# Patient Record
Sex: FEMALE | Race: BLACK OR AFRICAN AMERICAN | NOT HISPANIC OR LATINO | Employment: UNEMPLOYED
[De-identification: names, ages, dates, MRNs, and addresses within clinical notes are randomized per-mention and may not be internally consistent; named-entity substitution may affect disease eponyms.]

---

## 2017-12-24 ENCOUNTER — HEALTH MAINTENANCE LETTER (OUTPATIENT)
Age: 22
End: 2017-12-24

## 2020-03-10 ENCOUNTER — HEALTH MAINTENANCE LETTER (OUTPATIENT)
Age: 25
End: 2020-03-10

## 2020-12-20 ENCOUNTER — HEALTH MAINTENANCE LETTER (OUTPATIENT)
Age: 25
End: 2020-12-20

## 2021-01-15 LAB — GROUP B STREP PCR: NEGATIVE

## 2021-01-19 ENCOUNTER — HOSPITAL ENCOUNTER (INPATIENT)
Facility: CLINIC | Age: 26
LOS: 2 days | Discharge: HOME | End: 2021-01-21
Attending: OBSTETRICS & GYNECOLOGY | Admitting: OBSTETRICS & GYNECOLOGY
Payer: COMMERCIAL

## 2021-01-19 DIAGNOSIS — O34.219 VBAC (VAGINAL BIRTH AFTER CESAREAN): Primary | ICD-10-CM

## 2021-01-19 DIAGNOSIS — O34.219 VBAC, DELIVERED: ICD-10-CM

## 2021-01-19 PROBLEM — O99.013 ANEMIA DURING PREGNANCY IN THIRD TRIMESTER: Status: ACTIVE | Noted: 2021-01-19

## 2021-01-19 PROBLEM — O14.90 PREECLAMPSIA: Status: ACTIVE | Noted: 2021-01-19

## 2021-01-19 PROBLEM — O99.213 OBESITY AFFECTING PREGNANCY IN THIRD TRIMESTER: Status: ACTIVE | Noted: 2021-01-19

## 2021-01-19 PROBLEM — D56.3 BETA THALASSEMIA TRAIT: Status: ACTIVE | Noted: 2021-01-19

## 2021-01-19 LAB
ABO + RH BLD: NORMAL
ABO + RH BLD: NORMAL
ALT SERPL W P-5'-P-CCNC: 21 U/L (ref 0–50)
AST SERPL W P-5'-P-CCNC: 23 U/L (ref 0–45)
CREAT SERPL-MCNC: 0.43 MG/DL (ref 0.52–1.04)
CREAT UR-MCNC: 42 MG/DL
ERYTHROCYTE [DISTWIDTH] IN BLOOD BY AUTOMATED COUNT: 15.5 % (ref 10–15)
GFR SERPL CREATININE-BSD FRML MDRD: >90 ML/MIN/{1.73_M2}
HCT VFR BLD AUTO: 30.2 % (ref 35–47)
HGB BLD-MCNC: 9.4 G/DL (ref 11.7–15.7)
HGB BLD-MCNC: 9.5 G/DL (ref 11.7–15.7)
MCH RBC QN AUTO: 23.9 PG (ref 26.5–33)
MCHC RBC AUTO-ENTMCNC: 31.1 G/DL (ref 31.5–36.5)
MCV RBC AUTO: 77 FL (ref 78–100)
PLATELET # BLD AUTO: 225 10E9/L (ref 150–450)
PROT UR-MCNC: 0.26 G/L
PROT/CREAT 24H UR: 0.62 G/G CR (ref 0–0.2)
RBC # BLD AUTO: 3.93 10E12/L (ref 3.8–5.2)
SPECIMEN EXP DATE BLD: NORMAL
T PALLIDUM AB SER QL: NONREACTIVE
WBC # BLD AUTO: 6.9 10E9/L (ref 4–11)

## 2021-01-19 PROCEDURE — 84460 ALANINE AMINO (ALT) (SGPT): CPT | Performed by: OBSTETRICS & GYNECOLOGY

## 2021-01-19 PROCEDURE — 36415 COLL VENOUS BLD VENIPUNCTURE: CPT | Performed by: OBSTETRICS & GYNECOLOGY

## 2021-01-19 PROCEDURE — 250N000009 HC RX 250: Performed by: OBSTETRICS & GYNECOLOGY

## 2021-01-19 PROCEDURE — 86780 TREPONEMA PALLIDUM: CPT | Performed by: OBSTETRICS & GYNECOLOGY

## 2021-01-19 PROCEDURE — 85018 HEMOGLOBIN: CPT | Performed by: OBSTETRICS & GYNECOLOGY

## 2021-01-19 PROCEDURE — 85027 COMPLETE CBC AUTOMATED: CPT | Performed by: OBSTETRICS & GYNECOLOGY

## 2021-01-19 PROCEDURE — 86900 BLOOD TYPING SEROLOGIC ABO: CPT | Performed by: OBSTETRICS & GYNECOLOGY

## 2021-01-19 PROCEDURE — 722N000001 HC LABOR CARE VAGINAL DELIVERY SINGLE

## 2021-01-19 PROCEDURE — 82565 ASSAY OF CREATININE: CPT | Performed by: OBSTETRICS & GYNECOLOGY

## 2021-01-19 PROCEDURE — 258N000003 HC RX IP 258 OP 636: Performed by: OBSTETRICS & GYNECOLOGY

## 2021-01-19 PROCEDURE — 3E033VJ INTRODUCTION OF OTHER HORMONE INTO PERIPHERAL VEIN, PERCUTANEOUS APPROACH: ICD-10-PCS | Performed by: OBSTETRICS & GYNECOLOGY

## 2021-01-19 PROCEDURE — 250N000013 HC RX MED GY IP 250 OP 250 PS 637: Performed by: OBSTETRICS & GYNECOLOGY

## 2021-01-19 PROCEDURE — 84450 TRANSFERASE (AST) (SGOT): CPT | Performed by: OBSTETRICS & GYNECOLOGY

## 2021-01-19 PROCEDURE — 84156 ASSAY OF PROTEIN URINE: CPT | Performed by: OBSTETRICS & GYNECOLOGY

## 2021-01-19 PROCEDURE — 250N000011 HC RX IP 250 OP 636: Performed by: OBSTETRICS & GYNECOLOGY

## 2021-01-19 PROCEDURE — 10907ZC DRAINAGE OF AMNIOTIC FLUID, THERAPEUTIC FROM PRODUCTS OF CONCEPTION, VIA NATURAL OR ARTIFICIAL OPENING: ICD-10-PCS | Performed by: OBSTETRICS & GYNECOLOGY

## 2021-01-19 PROCEDURE — 86901 BLOOD TYPING SEROLOGIC RH(D): CPT | Performed by: OBSTETRICS & GYNECOLOGY

## 2021-01-19 PROCEDURE — 120N000001 HC R&B MED SURG/OB

## 2021-01-19 RX ORDER — MAGNESIUM SULFATE HEPTAHYDRATE 40 MG/ML
4 INJECTION, SOLUTION INTRAVENOUS ONCE
Status: DISCONTINUED | OUTPATIENT
Start: 2021-01-19 | End: 2021-01-19

## 2021-01-19 RX ORDER — NALOXONE HYDROCHLORIDE 0.4 MG/ML
0.2 INJECTION, SOLUTION INTRAMUSCULAR; INTRAVENOUS; SUBCUTANEOUS
Status: DISCONTINUED | OUTPATIENT
Start: 2021-01-19 | End: 2021-01-19

## 2021-01-19 RX ORDER — IBUPROFEN 800 MG/1
800 TABLET, FILM COATED ORAL
Status: COMPLETED | OUTPATIENT
Start: 2021-01-19 | End: 2021-01-19

## 2021-01-19 RX ORDER — OXYTOCIN/0.9 % SODIUM CHLORIDE 30/500 ML
100 PLASTIC BAG, INJECTION (ML) INTRAVENOUS CONTINUOUS
Status: DISCONTINUED | OUTPATIENT
Start: 2021-01-19 | End: 2021-01-21 | Stop reason: HOSPADM

## 2021-01-19 RX ORDER — CALCIUM GLUCONATE 94 MG/ML
1 INJECTION, SOLUTION INTRAVENOUS
Status: DISCONTINUED | OUTPATIENT
Start: 2021-01-19 | End: 2021-01-19

## 2021-01-19 RX ORDER — OXYTOCIN/0.9 % SODIUM CHLORIDE 30/500 ML
1-24 PLASTIC BAG, INJECTION (ML) INTRAVENOUS CONTINUOUS
Status: DISCONTINUED | OUTPATIENT
Start: 2021-01-19 | End: 2021-01-19

## 2021-01-19 RX ORDER — LIDOCAINE 40 MG/G
CREAM TOPICAL
Status: DISCONTINUED | OUTPATIENT
Start: 2021-01-19 | End: 2021-01-19

## 2021-01-19 RX ORDER — METHYLERGONOVINE MALEATE 0.2 MG/ML
200 INJECTION INTRAVENOUS
Status: DISCONTINUED | OUTPATIENT
Start: 2021-01-19 | End: 2021-01-19

## 2021-01-19 RX ORDER — CARBOPROST TROMETHAMINE 250 UG/ML
250 INJECTION, SOLUTION INTRAMUSCULAR
Status: DISCONTINUED | OUTPATIENT
Start: 2021-01-19 | End: 2021-01-21 | Stop reason: HOSPADM

## 2021-01-19 RX ORDER — ONDANSETRON 4 MG/1
4 TABLET, ORALLY DISINTEGRATING ORAL EVERY 6 HOURS PRN
Status: DISCONTINUED | OUTPATIENT
Start: 2021-01-19 | End: 2021-01-19

## 2021-01-19 RX ORDER — LABETALOL HYDROCHLORIDE 5 MG/ML
40 INJECTION, SOLUTION INTRAVENOUS EVERY 10 MIN PRN
Status: DISCONTINUED | OUTPATIENT
Start: 2021-01-19 | End: 2021-01-21 | Stop reason: HOSPADM

## 2021-01-19 RX ORDER — LABETALOL HYDROCHLORIDE 5 MG/ML
20 INJECTION, SOLUTION INTRAVENOUS EVERY 10 MIN PRN
Status: DISCONTINUED | OUTPATIENT
Start: 2021-01-19 | End: 2021-01-21 | Stop reason: HOSPADM

## 2021-01-19 RX ORDER — ONDANSETRON 2 MG/ML
4 INJECTION INTRAMUSCULAR; INTRAVENOUS EVERY 6 HOURS PRN
Status: DISCONTINUED | OUTPATIENT
Start: 2021-01-19 | End: 2021-01-19

## 2021-01-19 RX ORDER — NALOXONE HYDROCHLORIDE 0.4 MG/ML
0.4 INJECTION, SOLUTION INTRAMUSCULAR; INTRAVENOUS; SUBCUTANEOUS
Status: DISCONTINUED | OUTPATIENT
Start: 2021-01-19 | End: 2021-01-19

## 2021-01-19 RX ORDER — LORAZEPAM 2 MG/ML
2 INJECTION INTRAMUSCULAR
Status: DISCONTINUED | OUTPATIENT
Start: 2021-01-19 | End: 2021-01-21 | Stop reason: HOSPADM

## 2021-01-19 RX ORDER — MAGNESIUM SULFATE HEPTAHYDRATE 500 MG/ML
4 INJECTION, SOLUTION INTRAMUSCULAR; INTRAVENOUS
Status: DISCONTINUED | OUTPATIENT
Start: 2021-01-19 | End: 2021-01-19

## 2021-01-19 RX ORDER — OXYTOCIN 10 [USP'U]/ML
10 INJECTION, SOLUTION INTRAMUSCULAR; INTRAVENOUS
Status: DISCONTINUED | OUTPATIENT
Start: 2021-01-19 | End: 2021-01-21 | Stop reason: HOSPADM

## 2021-01-19 RX ORDER — TRANEXAMIC ACID 10 MG/ML
1 INJECTION, SOLUTION INTRAVENOUS EVERY 30 MIN PRN
Status: DISCONTINUED | OUTPATIENT
Start: 2021-01-19 | End: 2021-01-21 | Stop reason: HOSPADM

## 2021-01-19 RX ORDER — LIDOCAINE HYDROCHLORIDE AND EPINEPHRINE 15; 5 MG/ML; UG/ML
3 INJECTION, SOLUTION EPIDURAL
Status: DISCONTINUED | OUTPATIENT
Start: 2021-01-19 | End: 2021-01-19

## 2021-01-19 RX ORDER — HYDROCORTISONE 2.5 %
CREAM (GRAM) TOPICAL 3 TIMES DAILY PRN
Status: DISCONTINUED | OUTPATIENT
Start: 2021-01-19 | End: 2021-01-21 | Stop reason: HOSPADM

## 2021-01-19 RX ORDER — OXYCODONE AND ACETAMINOPHEN 5; 325 MG/1; MG/1
1 TABLET ORAL
Status: DISCONTINUED | OUTPATIENT
Start: 2021-01-19 | End: 2021-01-19

## 2021-01-19 RX ORDER — MAGNESIUM SULFATE HEPTAHYDRATE 40 MG/ML
2 INJECTION, SOLUTION INTRAVENOUS
Status: DISCONTINUED | OUTPATIENT
Start: 2021-01-19 | End: 2021-01-19

## 2021-01-19 RX ORDER — MISOPROSTOL 200 UG/1
400 TABLET ORAL
Status: DISCONTINUED | OUTPATIENT
Start: 2021-01-19 | End: 2021-01-21 | Stop reason: HOSPADM

## 2021-01-19 RX ORDER — NALBUPHINE HYDROCHLORIDE 10 MG/ML
2.5-5 INJECTION, SOLUTION INTRAMUSCULAR; INTRAVENOUS; SUBCUTANEOUS EVERY 6 HOURS PRN
Status: DISCONTINUED | OUTPATIENT
Start: 2021-01-19 | End: 2021-01-19

## 2021-01-19 RX ORDER — MAGNESIUM SULFATE HEPTAHYDRATE 40 MG/ML
4 INJECTION, SOLUTION INTRAVENOUS
Status: DISCONTINUED | OUTPATIENT
Start: 2021-01-19 | End: 2021-01-19

## 2021-01-19 RX ORDER — SODIUM CHLORIDE, SODIUM LACTATE, POTASSIUM CHLORIDE, CALCIUM CHLORIDE 600; 310; 30; 20 MG/100ML; MG/100ML; MG/100ML; MG/100ML
10-125 INJECTION, SOLUTION INTRAVENOUS CONTINUOUS
Status: DISCONTINUED | OUTPATIENT
Start: 2021-01-19 | End: 2021-01-19

## 2021-01-19 RX ORDER — MODIFIED LANOLIN
OINTMENT (GRAM) TOPICAL
Status: DISCONTINUED | OUTPATIENT
Start: 2021-01-19 | End: 2021-01-21 | Stop reason: HOSPADM

## 2021-01-19 RX ORDER — BISACODYL 10 MG
10 SUPPOSITORY, RECTAL RECTAL DAILY PRN
Status: DISCONTINUED | OUTPATIENT
Start: 2021-01-21 | End: 2021-01-21 | Stop reason: HOSPADM

## 2021-01-19 RX ORDER — OXYTOCIN/0.9 % SODIUM CHLORIDE 30/500 ML
100-340 PLASTIC BAG, INJECTION (ML) INTRAVENOUS CONTINUOUS PRN
Status: DISCONTINUED | OUTPATIENT
Start: 2021-01-19 | End: 2021-01-19

## 2021-01-19 RX ORDER — EPHEDRINE SULFATE 50 MG/ML
5 INJECTION, SOLUTION INTRAMUSCULAR; INTRAVENOUS; SUBCUTANEOUS
Status: DISCONTINUED | OUTPATIENT
Start: 2021-01-19 | End: 2021-01-19

## 2021-01-19 RX ORDER — LABETALOL HYDROCHLORIDE 5 MG/ML
80 INJECTION, SOLUTION INTRAVENOUS EVERY 10 MIN PRN
Status: DISCONTINUED | OUTPATIENT
Start: 2021-01-19 | End: 2021-01-21 | Stop reason: HOSPADM

## 2021-01-19 RX ORDER — CARBOPROST TROMETHAMINE 250 UG/ML
250 INJECTION, SOLUTION INTRAMUSCULAR
Status: DISCONTINUED | OUTPATIENT
Start: 2021-01-19 | End: 2021-01-19

## 2021-01-19 RX ORDER — OXYTOCIN/0.9 % SODIUM CHLORIDE 30/500 ML
340 PLASTIC BAG, INJECTION (ML) INTRAVENOUS CONTINUOUS PRN
Status: DISCONTINUED | OUTPATIENT
Start: 2021-01-19 | End: 2021-01-21 | Stop reason: HOSPADM

## 2021-01-19 RX ORDER — ACETAMINOPHEN 325 MG/1
650 TABLET ORAL EVERY 4 HOURS PRN
Status: DISCONTINUED | OUTPATIENT
Start: 2021-01-19 | End: 2021-01-21 | Stop reason: HOSPADM

## 2021-01-19 RX ORDER — TRANEXAMIC ACID 10 MG/ML
1 INJECTION, SOLUTION INTRAVENOUS EVERY 30 MIN PRN
Status: DISCONTINUED | OUTPATIENT
Start: 2021-01-19 | End: 2021-01-19

## 2021-01-19 RX ORDER — SODIUM CHLORIDE, SODIUM LACTATE, POTASSIUM CHLORIDE, CALCIUM CHLORIDE 600; 310; 30; 20 MG/100ML; MG/100ML; MG/100ML; MG/100ML
INJECTION, SOLUTION INTRAVENOUS CONTINUOUS
Status: DISCONTINUED | OUTPATIENT
Start: 2021-01-19 | End: 2021-01-19

## 2021-01-19 RX ORDER — OXYTOCIN 10 [USP'U]/ML
10 INJECTION, SOLUTION INTRAMUSCULAR; INTRAVENOUS
Status: DISCONTINUED | OUTPATIENT
Start: 2021-01-19 | End: 2021-01-19

## 2021-01-19 RX ORDER — AMOXICILLIN 250 MG
1 CAPSULE ORAL 2 TIMES DAILY
Status: DISCONTINUED | OUTPATIENT
Start: 2021-01-19 | End: 2021-01-21 | Stop reason: HOSPADM

## 2021-01-19 RX ORDER — AMOXICILLIN 250 MG
2 CAPSULE ORAL 2 TIMES DAILY
Status: DISCONTINUED | OUTPATIENT
Start: 2021-01-19 | End: 2021-01-21 | Stop reason: HOSPADM

## 2021-01-19 RX ORDER — MAGNESIUM SULFATE IN WATER 40 MG/ML
2 INJECTION, SOLUTION INTRAVENOUS CONTINUOUS
Status: DISCONTINUED | OUTPATIENT
Start: 2021-01-19 | End: 2021-01-19

## 2021-01-19 RX ORDER — FENTANYL CITRATE 50 UG/ML
50-100 INJECTION, SOLUTION INTRAMUSCULAR; INTRAVENOUS
Status: DISCONTINUED | OUTPATIENT
Start: 2021-01-19 | End: 2021-01-19

## 2021-01-19 RX ORDER — IBUPROFEN 800 MG/1
800 TABLET, FILM COATED ORAL EVERY 6 HOURS PRN
Status: DISCONTINUED | OUTPATIENT
Start: 2021-01-20 | End: 2021-01-21 | Stop reason: HOSPADM

## 2021-01-19 RX ORDER — ACETAMINOPHEN 325 MG/1
650 TABLET ORAL EVERY 4 HOURS PRN
Status: DISCONTINUED | OUTPATIENT
Start: 2021-01-19 | End: 2021-01-19

## 2021-01-19 RX ORDER — NIFEDIPINE 10 MG/1
10 CAPSULE ORAL
Status: DISCONTINUED | OUTPATIENT
Start: 2021-01-19 | End: 2021-01-19

## 2021-01-19 RX ADMIN — IBUPROFEN 800 MG: 800 TABLET, FILM COATED ORAL at 18:52

## 2021-01-19 RX ADMIN — DOCUSATE SODIUM 50 MG AND SENNOSIDES 8.6 MG 1 TABLET: 8.6; 5 TABLET, FILM COATED ORAL at 21:58

## 2021-01-19 RX ADMIN — FENTANYL CITRATE 100 MCG: 50 INJECTION, SOLUTION INTRAMUSCULAR; INTRAVENOUS at 16:35

## 2021-01-19 RX ADMIN — OXYTOCIN-SODIUM CHLORIDE 0.9% IV SOLN 30 UNIT/500ML 2 MILLI-UNITS/MIN: 30-0.9/5 SOLUTION at 12:18

## 2021-01-19 RX ADMIN — SODIUM CHLORIDE, POTASSIUM CHLORIDE, SODIUM LACTATE AND CALCIUM CHLORIDE: 600; 310; 30; 20 INJECTION, SOLUTION INTRAVENOUS at 12:10

## 2021-01-19 SDOH — ECONOMIC STABILITY: INCOME INSECURITY: HOW HARD IS IT FOR YOU TO PAY FOR THE VERY BASICS LIKE FOOD, HOUSING, MEDICAL CARE, AND HEATING?: NOT ASKED

## 2021-01-19 SDOH — ECONOMIC STABILITY: FOOD INSECURITY: WITHIN THE PAST 12 MONTHS, YOU WORRIED THAT YOUR FOOD WOULD RUN OUT BEFORE YOU GOT MONEY TO BUY MORE.: NOT ASKED

## 2021-01-19 SDOH — ECONOMIC STABILITY: TRANSPORTATION INSECURITY
IN THE PAST 12 MONTHS, HAS LACK OF TRANSPORTATION KEPT YOU FROM MEETINGS, WORK, OR FROM GETTING THINGS NEEDED FOR DAILY LIVING?: NOT ASKED

## 2021-01-19 SDOH — ECONOMIC STABILITY: FOOD INSECURITY: WITHIN THE PAST 12 MONTHS, THE FOOD YOU BOUGHT JUST DIDN'T LAST AND YOU DIDN'T HAVE MONEY TO GET MORE.: NOT ASKED

## 2021-01-19 SDOH — ECONOMIC STABILITY: TRANSPORTATION INSECURITY
IN THE PAST 12 MONTHS, HAS THE LACK OF TRANSPORTATION KEPT YOU FROM MEDICAL APPOINTMENTS OR FROM GETTING MEDICATIONS?: NOT ASKED

## 2021-01-19 ASSESSMENT — MIFFLIN-ST. JEOR: SCORE: 1768.27

## 2021-01-19 NOTE — H&P
[facility] Labor and Delivery History and Physical    Titus Arnold MRN# [id number]   Age: 25 year old YOB: 1995     Date of Admission:  2021    Primary OB Provider:  Dr. Gusman           Chief Complaint:   Titus Arnold is a 25 year old female who is 37w1d pregnant and being admitted for induction of labor due to pre-eclampsia    HPI:  Pt is a 26 yo  at 37 weeks 1 day gestation who presents with elevated blood pressure and elevate TP/Cr ratio 0.62 consistent with pre-eclampsia.  IOL recommended at this gestational age.    This patient established care at 9 weeks 1 day gestation.  Her last pregnancy was complicated by PPROM with di/di twins at 31 weeks 5 days gestation.  She saw MFM at 16 weeks and her cervical length was normal at 3.47 cm.  She declined 17 OHP injections.  She then had a level II ultrasound which showed normal fetal anatomy and normal cervical length.  Her prenatal labs did also reveal that she was Hepatitis B non-immune and she did receive her Hepatitis B vaccine series during htis pregnancy.  Her early Hgb A1c was elevated, however her 1 hr GTT at 28 weeks was normal.  She also had low Hgb (9.0) however she has Beta thalassemia trait and hematology did not recommend iron therapy.    Her BP was mildly elevated at 35 weeks at 140/78.  Her labs were normal.  Her BP was normal at her next OB visit.  She did have COVID during this pregnancy (2020) and has recovered.         Pregnancy history:     OBSTETRIC HISTORY:    OB History    Para Term  AB Living   3 2 1 1 0 3   SAB TAB Ectopic Multiple Live Births   0 0 0 1 3      # Outcome Date GA Lbr Krish/2nd Weight Sex Delivery Anes PTL Lv   3 Current            2A  08/26/15 31w5d 00:20 / 00:03 1.66 kg (3 lb 10.6 oz) F Vag-Spont   SHANE      Complications:  premature rupture of membranes (PPROM) delivered, current hospitalization      Apgar1: 6  Apgar5: 8   2B  08/26/15 31w5d  00:20 / 00:15 1.91 kg (4 lb 3.4 oz) M CS-LTranv   SHANE      Complications:  premature rupture of membranes (PPROM) delivered, current hospitalization, Placental abruption, Transverse lie of fetus      Apgar1: 2  Apgar5: 7   1 Term 10/2006 40w0d   F   N SHANE       EDC: Estimated Date of Delivery: 21    Prenatal Labs:   Lab Results   Component Value Date    ABO PENDING 2021    RH  Pos 2015    AS Neg 2015    HEPBANG Negative 2015    TREPAB Non-reactive 2015    HGB 9.5 (L) 2021       GBS Status:   Lab Results   Component Value Date    GBS NEGATIVE 01/15/2021       Active Problem List  Patient Active Problem List   Diagnosis     Preeclampsia     Indication for care in labor or delivery     Anemia during pregnancy in third trimester     Obesity affecting pregnancy in third trimester     Previous  delivery, antepartum condition or complication     Beta thalassemia trait       Medication Prior to Admission  Medications Prior to Admission   Medication Sig Dispense Refill Last Dose     ferrous sulfate (IRON) 325 (65 FE) MG tablet Take 1 tablet (325 mg) by mouth 2 times daily (with meals) 60 tablet 0 Past Month at Unknown time     Prenatal Vit-Fe Fumarate-FA (PRENATAL MULTIVITAMIN  PLUS IRON) 27-0.8 MG TABS Take 1 tablet by mouth daily   2021 at Unknown time     senna-docusate (SENOKOT-S;PERICOLACE) 8.6-50 MG per tablet Take 1-2 tablets by mouth 2 times daily 30 tablet 0 Past Week at Unknown time     Ferrous Sulfate (IRON SUPPLEMENT PO)       .        Maternal Past Medical History:     Past Medical History:   Diagnosis Date     Anemia      Beta thalassemia (H)      Clinical diagnosis of COVID-19 2020     Gestational diabetes mellitus, class A2 2015     Labial cyst 2020     Obesity                        Family History:   This patient has no significant family history            Social History:     Social History     Tobacco Use     Smoking  "status: Never Smoker     Smokeless tobacco: Never Used   Substance Use Topics     Alcohol use: No     Drug use: No              Review of Systems:   The Review of Systems is negative other than noted in the HPI          Physical Exam:     Vitals were reviewed  Patient Vitals for the past 8 hrs:   BP Temp Temp src Resp Height Weight   21 1130 137/83 -- -- -- -- --   21 1100 130/77 -- -- -- -- --   21 1025 137/83 -- -- -- -- --   21 0955 (!) 141/86 -- -- -- -- --   21 0952 -- 99.1  F (37.3  C) Oral 16 1.676 m (5' 6\") 101.2 kg (223 lb)   21 0940 (!) 145/88 -- -- -- -- --     Gen:  Pt is alert and oriented x 3.  No acute distress  Abd:  Soft, gravid, nontender  Ext:  1+ edema  Cervix:   Membranes: intact   Dilation: 2   Effacement: 50%   Station:-2   Consistency: soft   Position: Anterior  Presentation:Cephalic  Fetal Heart Rate Tracing: reactive and reassuring  Tocometer: irregular contractions                       Assessment:   Titus Arnold is a 37w1d pregnant female admitted with pre-eclampsia          Plan:   Admit to Family Birth  Pt desires TOLAC - pt signed consent in clinic however I did again discussed risks associated with TOLAC including risk of uterine rupture which is a rare but catastrophic complication which could result in an emergency  section, hemorrhage, need for hysterectomy, fetal distress, fetal hypoxia and/or fetal death.  Despite these risks, be wished to attempt TOLAC  Continuous fetal monitoring  Pitocin induction  Will AROM next exam  Routine labor care    Es Garibay MD  Ob/Gyn  [facility]  [phone]      Es Garibay MD  "

## 2021-01-19 NOTE — PLAN OF CARE
Data: [facility] presented [facility]: 2021  9:27 AM.  Reason for maternal/fetal assessment is hypertension disorder of pregnancy. Patient reports being in clinic and had elevated bp 141/77, pt denies any other symptoms, noted trace of swelling @ble and bilat hands.  Patient is a .  Prenatal record reviewed. Pregnancy has been uncomplicated. Pt reports first pregnancy was twins, baby A was delivered vaginally, baby B was delivered via c/s, second pregnancy was a vaginal delivery () in [facility].  Gestational Age 37w1d. VSS. Fetal movement present. Patient denies uterine contractions, leaking of vaginal fluid/rupture of membranes, vaginal bleeding, abdominal pain, pelvic pressure, nausea, vomiting, headache, visual disturbances, epigastric or URQ pain. Support person is not present.   Action: Verbal consent for EFM. Triage assessment completed. Bill of rights reviewed.  Response: Patient verbalized agreement with plan. Will contact Dr Es Garibay with update and further orders.

## 2021-01-19 NOTE — PLAN OF CARE
MD updated regarding lab values in department and blood pressure readings. Orders to induce for pre-clampsia. Cervix favorable 2/60/-2 with a cruz score of 8. Orders for standard pitocin induction, plan to AROM in 2 hours. Standard hypertensive order set placed with labetalol progression for treatment as needed.     Patient has had one successful vaginal birth,  with twins, and this is a TOLAC, consent for TOLAC is signed and scanned in medical record.

## 2021-01-19 NOTE — PROVIDER NOTIFICATION
21 1351   Provider Notification   Provider Name/Title Dr. Garibay   Method of Notification At Bedside     MD at bedside for AROM. Small amount of clear fluid. SVE 3/60/-2. Anticipate .

## 2021-01-19 NOTE — PROGRESS NOTES
"S:  Starting to feel mild contractions  O:  /83   Temp 99.1  F (37.3  C) (Oral)   Resp 16   Ht 1.676 m (5' 6\")   Wt 101.2 kg (223 lb)   BMI 35.99 kg/m    FHT:  130s moderate variability  [place]:  Irreg  SVE:  360/-2    A/P:  26 yo  female at 37 weeks 1 day - IOL for pre-eclampsia/  -pitocin at 6 mu/min  -AROM - clear  -FHT reassuring  -continue current care    Es Garibay MD  Ob/Gyn  [facility]  [phone]    "

## 2021-01-20 LAB
ALT SERPL W P-5'-P-CCNC: 22 U/L (ref 0–50)
AST SERPL W P-5'-P-CCNC: 32 U/L (ref 0–45)
CREAT SERPL-MCNC: 0.46 MG/DL (ref 0.52–1.04)
ERYTHROCYTE [DISTWIDTH] IN BLOOD BY AUTOMATED COUNT: 15.3 % (ref 10–15)
GFR SERPL CREATININE-BSD FRML MDRD: >90 ML/MIN/{1.73_M2}
HCT VFR BLD AUTO: 29 % (ref 35–47)
HGB BLD-MCNC: 9.2 G/DL (ref 11.7–15.7)
MCH RBC QN AUTO: 24.1 PG (ref 26.5–33)
MCHC RBC AUTO-ENTMCNC: 31.7 G/DL (ref 31.5–36.5)
MCV RBC AUTO: 76 FL (ref 78–100)
PLATELET # BLD AUTO: 220 10E9/L (ref 150–450)
RBC # BLD AUTO: 3.82 10E12/L (ref 3.8–5.2)
WBC # BLD AUTO: 10.7 10E9/L (ref 4–11)

## 2021-01-20 PROCEDURE — 84460 ALANINE AMINO (ALT) (SGPT): CPT | Performed by: OBSTETRICS & GYNECOLOGY

## 2021-01-20 PROCEDURE — 250N000013 HC RX MED GY IP 250 OP 250 PS 637: Performed by: OBSTETRICS & GYNECOLOGY

## 2021-01-20 PROCEDURE — 120N000001 HC R&B MED SURG/OB

## 2021-01-20 PROCEDURE — 36415 COLL VENOUS BLD VENIPUNCTURE: CPT | Performed by: OBSTETRICS & GYNECOLOGY

## 2021-01-20 PROCEDURE — 84450 TRANSFERASE (AST) (SGOT): CPT | Performed by: OBSTETRICS & GYNECOLOGY

## 2021-01-20 PROCEDURE — 82565 ASSAY OF CREATININE: CPT | Performed by: OBSTETRICS & GYNECOLOGY

## 2021-01-20 PROCEDURE — 85027 COMPLETE CBC AUTOMATED: CPT | Performed by: OBSTETRICS & GYNECOLOGY

## 2021-01-20 RX ORDER — AMOXICILLIN 250 MG
1 CAPSULE ORAL 2 TIMES DAILY PRN
Qty: 60 TABLET | Refills: 0 | Status: SHIPPED | OUTPATIENT
Start: 2021-01-20 | End: 2022-04-13

## 2021-01-20 RX ORDER — ACETAMINOPHEN 325 MG/1
650 TABLET ORAL EVERY 6 HOURS PRN
Qty: 30 TABLET | Refills: 0 | Status: SHIPPED | OUTPATIENT
Start: 2021-01-20

## 2021-01-20 RX ORDER — IBUPROFEN 800 MG/1
800 TABLET, FILM COATED ORAL EVERY 6 HOURS PRN
Qty: 30 TABLET | Refills: 0 | Status: ON HOLD | OUTPATIENT
Start: 2021-01-20 | End: 2023-04-29

## 2021-01-20 RX ADMIN — ACETAMINOPHEN 650 MG: 325 TABLET, FILM COATED ORAL at 16:55

## 2021-01-20 RX ADMIN — DOCUSATE SODIUM 50 MG AND SENNOSIDES 8.6 MG 1 TABLET: 8.6; 5 TABLET, FILM COATED ORAL at 08:18

## 2021-01-20 RX ADMIN — DOCUSATE SODIUM 50 MG AND SENNOSIDES 8.6 MG 1 TABLET: 8.6; 5 TABLET, FILM COATED ORAL at 21:52

## 2021-01-20 NOTE — PLAN OF CARE
Data: Titus Arnold transferred to [facility] via wheelchair at 2055. Baby transferred via parent's arms.  Action: Receiving unit notified of transfer: Yes. Patient and family notified of room change. Report given to Ursula Sena RN at 2100. Belongings sent to receiving unit. Accompanied by Registered Nurse. Oriented patient to surroundings. Call light within reach. ID bands double-checked with receiving RN.  Response: Patient tolerated transfer and is stable.

## 2021-01-20 NOTE — PROGRESS NOTES
[facility]  PPD# 1    Pt doing well. Denies any CP, SOB, LH, HA, scotomata, RUQ or epigastric pain. Ambulating, voiding, tolerating PO. Decreased lochia. Pain controlled. Breast feeding and coping well with infant.    Vitals:    21 2110 21 0006 21 0557 21 0810   BP: 137/73 129/72 133/68 133/76   Pulse: 93 83 78 79   Resp: 18 18 18 18   Temp: 98  F (36.7  C) 98.7  F (37.1  C) 97.9  F (36.6  C) 98.5  F (36.9  C)   TempSrc: Oral Oral Oral Oral   Weight:       Height:         Abd soft, appropriately tender, nondistended, FFBU, no fundal tenderness  Ext 1+ edema bilat LE, no CT BL    Lab Results   Component Value Date    HGB 9.5 2021       A/P 25 year old  at 37w1d s/p  PPD# 1. Pregnancy complicated by pre-eclampsia w/o SF, beta thalassemia trait, Covid-19 (20), hx of prior CD.     -Hemodynamically stable   - Rh pos  -Diet; regular  -pain Tylenol and Motrin  -Bowel ppx- Senna/docusate/simethicone  -Rubella immune  -Tdap given  -Breast feeding, breast pump provided  - Preeclampsia w/o SF   - BP have been WNL   - pre-E labs WNL   - no signs or symptoms of severe features  - MYRIAM   - continue PO iron, rich iron diet, vitamin C    -Plan; continue routine post-partum care. Anticipate discharge home PPD#2      Dr. Jesse Cordova  [phone]  2021 9:53 PM

## 2021-01-20 NOTE — L&D DELIVERY NOTE
OB Vaginal Delivery Note    Titus Arnold MRN# [id number]   Age: 25 year old YOB: 1995       GA: 37w1d  GP:   Labor Complications: Preeclampsia/Hypertension   EBL: 200  mL  Delivery QBL: 200 mL  Delivery Type: , Spontaneous   ROM to Delivery Time: (Delivered) Hours: 4 Minutes: 43   Weight:  Pending   1 Minute 5 Minute 10 Minute   Apgar Totals: 9   9             Delivery Details:  Titus Arnold, a 25 year old  female was admitted with pre-eclampsia.  IOL was recommended.  Pitocin was initiated. AROM was performed for clear fluid.  She progressed normally in labor.  She became complete and pushed very effectively.  She then delivered a viable female infant with apgars of 9  and 9 .  Delivery was via , spontaneous  to a sterile field under intravenous regional  anesthesia. Infant delivered in vertex  right  occiput  anterior  position. Anterior and posterior shoulders delivered without difficulty. The cord was clamped after 60 second delay and cut by the father of the baby.  3 vessels  were noted. Cord blood was obtained in routine fashion with the following disposition: lab .      Cord complications: none   Placenta delivered at 2021  6:43 PM . Placental disposition was Hospital disposal . Fundal massage performed and fundus found to be firm.     Episiotomy: none    Perineum, vagina, cervix were inspected, and the following lacerations were noted:   Perineal lacerations: none                No lacerations were noted.    Excellent hemostasis was noted. Needle count correct. Infant and patient in delivery room in good and stable condition.        [place]ina Female-Titus [[id number]]    Labor Event Times    Labor onset date: 21 Onset time:  3:00 PM   Dilation complete date: 21 Complete time:  6:36 PM   Start pushing date/time: 2021 1835      Labor Events     labor?: No   steroids: None  Labor Type: Induction/Cervical ripening  Predominate  monitoring during 1st stage: continuous electronic fetal monitoring     Antibiotics received during labor?: No     Rupture date/time: 21 1354   Rupture type: Artificial Rupture of Membranes  Fluid color: Clear  Fluid odor: Normal     Induction: Oxytocin  Induction date/time:     Cervical ripening date/time:     Indications for induction: Hypertension     Delivery/Placenta Date and Time    Delivery Date: 21 Delivery Time:  6:37 PM   Placenta Date/Time: 2021  6:43 PM  Oxytocin given at the time of delivery: after delivery of baby     Vaginal Counts     Initial count performed by 2 team members:  Two Team Members   Dr. Phoenix Hanley       [employer]   Initial counts 2  5    Added to count       Final counts 2  5    Placed during labor Accounted for at the end of labor   No NA   No NA   No NA    Final count performed by 2 team members:  Two Team Members   Dr. Phoenix Hanley RN      Final count correct?: Yes     Apgars    Living status: Living   1 Minute 5 Minute 10 Minute 15 Minute 20 Minute   Skin color: 1  1       Heart rate: 2  2       Reflex irritability: 2  2       Muscle tone: 2  2       Respiratory effort: 2  2       Total: 9  9       Apgars assigned by: LOGAN HANLEY RN     Cord    Vessels: 3 Vessels Complications: None   Cord Blood Disposition: Lab Gases Sent?: No      [place] Resuscitation    Methods: None     Skin to Skin and Feeding Plan    Skin to skin initiation date/time:     Skin to skin end date/time:     How do you plan to feed your baby: Breastfeeding     Labor Events and Shoulder Dystocia    Fetal Tracing Prior to Delivery: Category 1  Shoulder dystocia present?: Neg     Delivery (Maternal) (Provider to Complete) ([id number])    Episiotomy: None  Perineal lacerations: None    Est. blood loss (mL): 200     Blood Loss  Mother: Titus Arnold T #[id number]   Start of Mother's Information    IO Blood Loss  21 1500 - 21 1853    EBL (mL)  Hospital Encounter 200 mL    Delivery QBL (mL) Hospital Encounter 200 mL    Total  400 mL         End of Mother's Information  Mother: Titus Arnold T #[id number]          Delivery - Provider to Complete ([id number])    Delivering clinician: Es Garibay MD  Attempted Delivery Types (Choose all that apply): Vaginal Birth After   Delivery Type (Choose the 1 that will go to the Birth History): , Spontaneous                                 Placenta    Delayed Cord Clamping: Done  Date/Time: 2021  6:43 PM  Removal: Spontaneous  Comments: Intact.  3 vessel cord.  Disposition: Hospital disposal           Anesthesia    Method: INTRAVENOUS REGIONAL                Presentation and Position    Presentation: Vertex    Position: Right Occiput Anterior                 Es Garibay MD

## 2021-01-20 NOTE — PLAN OF CARE
VSS, uterus and bleeding WNL. Up ad nathan. Independent with self and  cares. Pain well-controlled by occasional ibuprofen. No headache, changes in vision or chest pain noted. Breastfeeding well. With tender nipples- motherslove cream and hydrogel provided. Saline lock flushed, patent. For pre-eclampsia lab draw this morning.  present at bedside.

## 2021-01-20 NOTE — PLAN OF CARE
Data: Vital signs within normal limits. Postpartum checks within normal limits - see flow record. Patient eating and drinking normally. Patient able to empty bladder independently and is up ambulating. No apparent signs of infection. Patient performing self cares and is able to care for infant.  Patient independent with breastfeeding. Patient denies need for pain medication this shift, aware that it is available if needed.  Action: Patient education done about self and infant cares, breastfeeding, discharge planning. See flow record.  Response: Positive attachment behaviors observed with infant. Support persons are present- spouse Lobo is at bedside.   Plan: Anticipate discharge on 1/21/2021.

## 2021-01-20 NOTE — PROGRESS NOTES
"S:  More uncomfortable.  Feeling some urge to push.  O:  /83   Temp 98.4  F (36.9  C) (Oral)   Resp 16   Ht 1.676 m (5' 6\")   Wt 101.2 kg (223 lb)   BMI 35.99 kg/m    FHT:  120-130s moderate variability  [place]:  q 1-3  SVE:  8/100/-1 - baby asynclitic - position changes    A\P:  26 yo  female at 37 weeks 1 day - IOL for pre-eclampsia/  -pitocin at 3 mu/min  -FHT reassuring  -s/p fentanyl  -continue current care  -expect vaginal delivery soon    Es Garibay MD  Ob/Gyn  [facility]  [phone]    "

## 2021-01-20 NOTE — PLAN OF CARE
To room 448 from L&D per wheelchair at 2100. Oriented to room and surroundings. Up to bathroom with SBA, voided without difficulty. Nursing independently. Ibuprofen prior to transfer, denies pain at this time. BP remains elevated, denies headache, visual disturbances or epigastric pain. FOB present and supportive, participating in baby cares.

## 2021-01-20 NOTE — DISCHARGE SUMMARY
[facility]    Discharge Summary  Obstetrics    Date of Admission:  2021  Date of Discharge:  2021  Discharging Provider: Devorah Concepcion DO      Discharge Diagnoses   Patient Active Problem List   Diagnosis     Preeclampsia     Indication for care in labor or delivery     Anemia during pregnancy in third trimester     Obesity affecting pregnancy in third trimester     Previous  delivery, antepartum condition or complication     Beta thalassemia trait      (vaginal birth after )     , delivered         History of Present Illness   Titus Arnold is a 25 year old female now  who presented to L&D @ 37w2d GA admitted for pre-Eclampsia without SF. Her pregnancy has been complicated by pre-eclampsia w/o SF, beta thalassemia trait, Covid-19 (20), hx of prior CD.. Please see her admit H&P for full details of her PMH, PSH, Meds, Allergies and exam on admit.    Hospital Course   Titus Arnold, a 25 year old  female was admitted with pre-eclampsia.  IOL was recommended.  Pitocin was initiated. AROM was performed for clear fluid.  She progressed normally in labor.  She became complete and pushed very effectively.  She then delivered a viable female infant with apgars of 9  and 9 .  Delivery was via , spontaneous  to a sterile field under intravenous regional  anesthesia. Infant delivered in vertex  right  occiput  anterior  position. Anterior and posterior shoulders delivered without difficulty. The cord was clamped after 60 second delay and cut by the father of the baby.  3 vessels  were noted. Cord blood was obtained in routine fashion with the following disposition: lab .       Cord complications: none   Placenta delivered at 2021  6:43 PM . Placental disposition was Hospital disposal . Fundal massage performed and fundus found to be firm.      Episiotomy: none    Perineum, vagina, cervix were inspected, and the following lacerations  were noted:   Perineal lacerations: none                 No lacerations were noted.     Excellent hemostasis was noted. Needle count correct. Infant and patient in delivery room in good and stable condition.     Her postpartum course was uncomplicated. On postpartum day 2, she was meeting all of her postpartum goals and deemed stable for discharge. She was voiding without difficulty, tolerating a regular diet without nausea and vomiting, her pain was well controlled on oral pain medicines and her lochia was appropriate.    Hgb:   Lab Results   Component Value Date    HGB 9.2 2021    HGB 9.5 2021       Lab Results   Component Value Date    RH Pos 2021    and rhogam was not indicated    Contraception was discussed and will be addressed at her postpartum appointment.    Instructions:  1) Call for temperature greater than 100.4F, foul smelling vaginal discharge, bleeding more than 1 pad per hour for 2 hrs, pain not controlled by oral pain meds, severe constipation or severe nausea or vomiting.  2) She was instructed to follow-up with her primary OB in 6 weeks for a routine postpartum visit  3) She was instructed to continue her PNV on discharge if she wished to breast feed her infant.        Discharge Disposition   Discharged to home   Condition at discharge: Satisfactory    Primary Care Physician   Physician No Ref-Primary    Consultations This Hospital Stay   ANESTHESIOLOGY IP CONSULT  HOME CARE POST PARTUM/ IP CONSULT  LACTATION IP CONSULT    Discharge Orders   No discharge procedures on file.  Discharge Medications   Current Discharge Medication List      START taking these medications    Details   acetaminophen (TYLENOL) 325 MG tablet Take 2 tablets (650 mg) by mouth every 6 hours as needed for mild pain or fever (greater than or equal to 38  C /100.4  F (oral) or 38.5  C/ 101.4  F (core).)  Qty: 30 tablet, Refills: 0    Associated Diagnoses:  (vaginal birth after )       ibuprofen (ADVIL/MOTRIN) 800 MG tablet Take 1 tablet (800 mg) by mouth every 6 hours as needed for moderate pain or other (cramping)  Qty: 30 tablet, Refills: 0    Associated Diagnoses:  (vaginal birth after )      !! senna-docusate (SENOKOT-S/PERICOLACE) 8.6-50 MG tablet Take 1 tablet by mouth 2 times daily as needed for constipation  Qty: 60 tablet, Refills: 0    Associated Diagnoses:  (vaginal birth after )       !! - Potential duplicate medications found. Please discuss with provider.      CONTINUE these medications which have NOT CHANGED    Details   Prenatal Vit-Fe Fumarate-FA (PRENATAL MULTIVITAMIN  PLUS IRON) 27-0.8 MG TABS Take 1 tablet by mouth daily      !! senna-docusate (SENOKOT-S;PERICOLACE) 8.6-50 MG per tablet Take 1-2 tablets by mouth 2 times daily  Qty: 30 tablet, Refills: 0    Associated Diagnoses:  delivery delivered       !! - Potential duplicate medications found. Please discuss with provider.      STOP taking these medications       ferrous sulfate (IRON) 325 (65 FE) MG tablet Comments:   Reason for Stopping:             Allergies   No Known Allergies

## 2021-01-21 VITALS
WEIGHT: 223 LBS | HEART RATE: 93 BPM | TEMPERATURE: 98.8 F | HEIGHT: 66 IN | BODY MASS INDEX: 35.84 KG/M2 | RESPIRATION RATE: 16 BRPM | SYSTOLIC BLOOD PRESSURE: 133 MMHG | DIASTOLIC BLOOD PRESSURE: 80 MMHG

## 2021-01-21 PROBLEM — O34.219 VBAC, DELIVERED: Status: ACTIVE | Noted: 2021-01-21

## 2021-01-21 PROBLEM — O34.219 VBAC (VAGINAL BIRTH AFTER CESAREAN): Status: ACTIVE | Noted: 2021-01-21

## 2021-01-21 PROCEDURE — 250N000013 HC RX MED GY IP 250 OP 250 PS 637: Performed by: OBSTETRICS & GYNECOLOGY

## 2021-01-21 RX ADMIN — DOCUSATE SODIUM 50 MG AND SENNOSIDES 8.6 MG 1 TABLET: 8.6; 5 TABLET, FILM COATED ORAL at 09:56

## 2021-01-21 NOTE — PLAN OF CARE
Vital signs stable. Pain managed with Tylenol as needed.  Pt is voiding without difficulty and ambulating on her own.  Pt is breastfeeding independently, some nipple soreness, cream given for comfort.   at bedside and supportive.  Bonding well with baby.      Discharge instructions and education reviewed with pt and her .  All questions and concerns addressed. Pt verbalized understanding.  Breast pump requested and given.  Discharge medications reviewed and given.  Pt discharged home in stable condition with all of her belongings accompanied by her  via ambulation at 1200.

## 2021-01-21 NOTE — PLAN OF CARE
Vital signs stable. Pt reports no headache, nausea, visual disturbances or RUQ/epigastric pain.  Reflexes 2+, no clonus.  Pain managed with Tylenol as needed.  Voiding without difficulty. Ambulating in room.  Up to shower today.  Breastfeeding, some nipple tenderness, cream and hydrogel pads at bedside.  at beside.  Bonding well with baby.

## 2021-01-21 NOTE — DISCHARGE INSTRUCTIONS
Postpartum Vaginal Delivery Instructions    Activity       Ask family and friends for help when you need it.    Do not place anything in your vagina for 6 weeks.    You are not restricted on other activities, but take it easy for a few weeks to allow your body to recover from delivery.  You are able to do any activities you feel up to that point.    No driving until you have stopped taking your pain medications (usually two weeks after delivery).     Call your health care provider if you have any of these symptoms:       Increased pain, swelling, redness, or fluid around your stiches from an episiotomy or perineal tear.    A fever above 100.4 F (38 C) with or without chills when placing a thermometer under your tongue.    You soak a sanitary pad with blood within 1 hour, or you see blood clots larger than a golf ball.    Bleeding that lasts more than 6 weeks.    Vaginal discharge that smells bad.    Severe pain, cramping or tenderness in your lower belly area.    A need to urinate more frequently (use the toilet more often), more urgently (use the toilet very quickly), or it burns when you urinate.    Nausea and vomiting.    Redness, swelling or pain around a vein in your leg.    Problems breastfeeding or a red or painful area on your breast.    Chest pain and cough or are gasping for air.    Problems coping with sadness, anxiety, or depression.  If you have any concerns about hurting yourself or the baby, call your provider immediately.     You have questions or concerns after you return home.     Keep your hands clean:  Always wash your hands before touching your perineal area and stitches.  This helps reduce your risk of infection.  If your hands aren't dirty, you may use an alcohol hand-rub to clean your hands. Keep your nails clean and short.        Preeclampsia   Call your doctor right away if you have any of the following:  - Edema (swelling) in your face or hands  - Rapid weight gain-about 1 pound or more in  a day  - Headache  - Abdominal pain on your right side  - Vision problems (flashes or spots)  - You have questions or concerns once you return home.

## 2021-01-21 NOTE — PLAN OF CARE
VSS, voiding without difficulty. Up ad nathan. Pain well-controlled by occasional Tylenol. Breastfeeding independently. Motherslove cream and hydrogel pads provided for tender nipples. Anticipating discharge today.

## 2021-01-21 NOTE — PROGRESS NOTES
"[facility] Postpartum Note    S:  Titus Arnold feels GOOD this morning. Was able to sleep last night. Pain control adequate. Lochia minimal. Voiding. Breast feeding. Mood Good. BP overnight 120-130/70-90.      O:  Vitals were reviewed  Blood pressure (!) 133/93, pulse 83, temperature 98.4  F (36.9  C), temperature source Oral, resp. rate 16, height 1.676 m (5' 6\"), weight 101.2 kg (223 lb), unknown if currently breastfeeding.      General: healthy, alert and no distress  Abd: soft, appropriately tender, fundus firm  Legs: Non-tender, 0+ pitting edema    RH(D)   Date Value Ref Range Status   2021 Pos  Final         Assessment and Plan:   Postpartum Day #2, status post vaginal birth after , doing well.  -- Discharge home  -- F/U 6 weeks w/ Primary OB  -- Discharge meds: tylenol, ibuprofen, senna,   -- Contraception: unsure   1. Preeclampsia without severe features    -one elevated BP overnight with diastolic in the 90's   -needs BP check in 1 week in the clinic   2. Blood loss anemia asymptomatic with beta thalassemia trait    - -per hematology no iron if hgb is between 8-10      Devorah Concepcion, DO, DO  "

## 2021-04-24 ENCOUNTER — HEALTH MAINTENANCE LETTER (OUTPATIENT)
Age: 26
End: 2021-04-24

## 2021-10-03 ENCOUNTER — HEALTH MAINTENANCE LETTER (OUTPATIENT)
Age: 26
End: 2021-10-03

## 2022-04-11 DIAGNOSIS — Z11.59 ENCOUNTER FOR SCREENING FOR OTHER VIRAL DISEASES: Primary | ICD-10-CM

## 2022-04-13 ENCOUNTER — LAB (OUTPATIENT)
Dept: URGENT CARE | Facility: URGENT CARE | Age: 27
End: 2022-04-13
Attending: OBSTETRICS & GYNECOLOGY
Payer: COMMERCIAL

## 2022-04-13 DIAGNOSIS — Z11.59 ENCOUNTER FOR SCREENING FOR OTHER VIRAL DISEASES: ICD-10-CM

## 2022-04-13 LAB — SARS-COV-2 RNA RESP QL NAA+PROBE: NEGATIVE

## 2022-04-13 PROCEDURE — U0003 INFECTIOUS AGENT DETECTION BY NUCLEIC ACID (DNA OR RNA); SEVERE ACUTE RESPIRATORY SYNDROME CORONAVIRUS 2 (SARS-COV-2) (CORONAVIRUS DISEASE [COVID-19]), AMPLIFIED PROBE TECHNIQUE, MAKING USE OF HIGH THROUGHPUT TECHNOLOGIES AS DESCRIBED BY CMS-2020-01-R: HCPCS

## 2022-04-13 PROCEDURE — U0005 INFEC AGEN DETEC AMPLI PROBE: HCPCS

## 2022-04-15 ENCOUNTER — ANESTHESIA EVENT (OUTPATIENT)
Dept: SURGERY | Facility: CLINIC | Age: 27
End: 2022-04-15
Payer: COMMERCIAL

## 2022-04-15 ENCOUNTER — ANESTHESIA (OUTPATIENT)
Dept: SURGERY | Facility: CLINIC | Age: 27
End: 2022-04-15
Payer: COMMERCIAL

## 2022-04-15 ENCOUNTER — HOSPITAL ENCOUNTER (OUTPATIENT)
Facility: CLINIC | Age: 27
Discharge: HOME | End: 2022-04-15
Attending: OBSTETRICS & GYNECOLOGY | Admitting: OBSTETRICS & GYNECOLOGY
Payer: COMMERCIAL

## 2022-04-15 VITALS
WEIGHT: 213.3 LBS | DIASTOLIC BLOOD PRESSURE: 73 MMHG | HEIGHT: 66 IN | TEMPERATURE: 97 F | HEART RATE: 64 BPM | BODY MASS INDEX: 34.28 KG/M2 | RESPIRATION RATE: 16 BRPM | SYSTOLIC BLOOD PRESSURE: 119 MMHG | OXYGEN SATURATION: 99 %

## 2022-04-15 DIAGNOSIS — O03.9 MISCARRIAGE: Primary | ICD-10-CM

## 2022-04-15 LAB
ABO/RH(D): NORMAL
ANTIBODY SCREEN: NEGATIVE
GLUCOSE BLDC GLUCOMTR-MCNC: 96 MG/DL (ref 70–99)
HGB BLD-MCNC: 9.7 G/DL (ref 11.7–15.7)
SPECIMEN EXPIRATION DATE: NORMAL

## 2022-04-15 PROCEDURE — 370N000017 HC ANESTHESIA TECHNICAL FEE, PER MIN: Performed by: OBSTETRICS & GYNECOLOGY

## 2022-04-15 PROCEDURE — 88305 TISSUE EXAM BY PATHOLOGIST: CPT | Mod: TC | Performed by: OBSTETRICS & GYNECOLOGY

## 2022-04-15 PROCEDURE — 82962 GLUCOSE BLOOD TEST: CPT

## 2022-04-15 PROCEDURE — 710N000009 HC RECOVERY PHASE 1, LEVEL 1, PER MIN: Performed by: OBSTETRICS & GYNECOLOGY

## 2022-04-15 PROCEDURE — 710N000012 HC RECOVERY PHASE 2, PER MINUTE: Performed by: OBSTETRICS & GYNECOLOGY

## 2022-04-15 PROCEDURE — 88305 TISSUE EXAM BY PATHOLOGIST: CPT | Mod: 26 | Performed by: PATHOLOGY

## 2022-04-15 PROCEDURE — 86901 BLOOD TYPING SEROLOGIC RH(D): CPT | Performed by: OBSTETRICS & GYNECOLOGY

## 2022-04-15 PROCEDURE — 250N000011 HC RX IP 250 OP 636: Performed by: NURSE ANESTHETIST, CERTIFIED REGISTERED

## 2022-04-15 PROCEDURE — 999N000141 HC STATISTIC PRE-PROCEDURE NURSING ASSESSMENT: Performed by: OBSTETRICS & GYNECOLOGY

## 2022-04-15 PROCEDURE — 360N000075 HC SURGERY LEVEL 2, PER MIN: Performed by: OBSTETRICS & GYNECOLOGY

## 2022-04-15 PROCEDURE — 85018 HEMOGLOBIN: CPT | Performed by: ANESTHESIOLOGY

## 2022-04-15 PROCEDURE — 258N000003 HC RX IP 258 OP 636: Performed by: ANESTHESIOLOGY

## 2022-04-15 PROCEDURE — 250N000009 HC RX 250: Performed by: NURSE ANESTHETIST, CERTIFIED REGISTERED

## 2022-04-15 PROCEDURE — 250N000011 HC RX IP 250 OP 636: Performed by: ANESTHESIOLOGY

## 2022-04-15 PROCEDURE — 272N000001 HC OR GENERAL SUPPLY STERILE: Performed by: OBSTETRICS & GYNECOLOGY

## 2022-04-15 PROCEDURE — 250N000013 HC RX MED GY IP 250 OP 250 PS 637: Performed by: OBSTETRICS & GYNECOLOGY

## 2022-04-15 PROCEDURE — 36415 COLL VENOUS BLD VENIPUNCTURE: CPT | Performed by: ANESTHESIOLOGY

## 2022-04-15 RX ORDER — FENTANYL CITRATE 50 UG/ML
25 INJECTION, SOLUTION INTRAMUSCULAR; INTRAVENOUS
Status: DISCONTINUED | OUTPATIENT
Start: 2022-04-15 | End: 2022-04-15 | Stop reason: HOSPADM

## 2022-04-15 RX ORDER — GLYCOPYRROLATE 0.2 MG/ML
INJECTION, SOLUTION INTRAMUSCULAR; INTRAVENOUS PRN
Status: DISCONTINUED | OUTPATIENT
Start: 2022-04-15 | End: 2022-04-15

## 2022-04-15 RX ORDER — ACETAMINOPHEN 325 MG/1
975 TABLET ORAL ONCE
Status: DISCONTINUED | OUTPATIENT
Start: 2022-04-15 | End: 2022-04-15 | Stop reason: HOSPADM

## 2022-04-15 RX ORDER — METHYLERGONOVINE MALEATE 0.2 MG/ML
INJECTION INTRAVENOUS PRN
Status: DISCONTINUED | OUTPATIENT
Start: 2022-04-15 | End: 2022-04-15

## 2022-04-15 RX ORDER — DEXAMETHASONE SODIUM PHOSPHATE 4 MG/ML
INJECTION, SOLUTION INTRA-ARTICULAR; INTRALESIONAL; INTRAMUSCULAR; INTRAVENOUS; SOFT TISSUE PRN
Status: DISCONTINUED | OUTPATIENT
Start: 2022-04-15 | End: 2022-04-15

## 2022-04-15 RX ORDER — METOPROLOL TARTRATE 1 MG/ML
1-2 INJECTION, SOLUTION INTRAVENOUS EVERY 5 MIN PRN
Status: DISCONTINUED | OUTPATIENT
Start: 2022-04-15 | End: 2022-04-15 | Stop reason: HOSPADM

## 2022-04-15 RX ORDER — LIDOCAINE HYDROCHLORIDE 10 MG/ML
INJECTION, SOLUTION EPIDURAL; INFILTRATION; INTRACAUDAL; PERINEURAL PRN
Status: DISCONTINUED | OUTPATIENT
Start: 2022-04-15 | End: 2022-04-15

## 2022-04-15 RX ORDER — ONDANSETRON 4 MG/1
4 TABLET, ORALLY DISINTEGRATING ORAL EVERY 30 MIN PRN
Status: DISCONTINUED | OUTPATIENT
Start: 2022-04-15 | End: 2022-04-15 | Stop reason: HOSPADM

## 2022-04-15 RX ORDER — ONDANSETRON 2 MG/ML
4 INJECTION INTRAMUSCULAR; INTRAVENOUS EVERY 30 MIN PRN
Status: DISCONTINUED | OUTPATIENT
Start: 2022-04-15 | End: 2022-04-15 | Stop reason: HOSPADM

## 2022-04-15 RX ORDER — FENTANYL CITRATE 50 UG/ML
25 INJECTION, SOLUTION INTRAMUSCULAR; INTRAVENOUS EVERY 5 MIN PRN
Status: DISCONTINUED | OUTPATIENT
Start: 2022-04-15 | End: 2022-04-15 | Stop reason: HOSPADM

## 2022-04-15 RX ORDER — OXYCODONE HYDROCHLORIDE 5 MG/1
5 TABLET ORAL EVERY 4 HOURS PRN
Status: DISCONTINUED | OUTPATIENT
Start: 2022-04-15 | End: 2022-04-15 | Stop reason: HOSPADM

## 2022-04-15 RX ORDER — DOXYCYCLINE 100 MG/1
100 CAPSULE ORAL ONCE
Status: COMPLETED | OUTPATIENT
Start: 2022-04-15 | End: 2022-04-15

## 2022-04-15 RX ORDER — IBUPROFEN 800 MG/1
800 TABLET, FILM COATED ORAL EVERY 6 HOURS PRN
Qty: 30 TABLET | Refills: 0 | Status: ON HOLD | OUTPATIENT
Start: 2022-04-15 | End: 2023-04-29

## 2022-04-15 RX ORDER — KETOROLAC TROMETHAMINE 15 MG/ML
15 INJECTION, SOLUTION INTRAMUSCULAR; INTRAVENOUS EVERY 6 HOURS PRN
Status: DISCONTINUED | OUTPATIENT
Start: 2022-04-15 | End: 2022-04-15 | Stop reason: HOSPADM

## 2022-04-15 RX ORDER — SODIUM CHLORIDE, SODIUM LACTATE, POTASSIUM CHLORIDE, CALCIUM CHLORIDE 600; 310; 30; 20 MG/100ML; MG/100ML; MG/100ML; MG/100ML
INJECTION, SOLUTION INTRAVENOUS CONTINUOUS
Status: DISCONTINUED | OUTPATIENT
Start: 2022-04-15 | End: 2022-04-15 | Stop reason: HOSPADM

## 2022-04-15 RX ORDER — FENTANYL CITRATE 50 UG/ML
INJECTION, SOLUTION INTRAMUSCULAR; INTRAVENOUS PRN
Status: DISCONTINUED | OUTPATIENT
Start: 2022-04-15 | End: 2022-04-15

## 2022-04-15 RX ORDER — HYDRALAZINE HYDROCHLORIDE 20 MG/ML
2.5-5 INJECTION INTRAMUSCULAR; INTRAVENOUS EVERY 10 MIN PRN
Status: DISCONTINUED | OUTPATIENT
Start: 2022-04-15 | End: 2022-04-15 | Stop reason: HOSPADM

## 2022-04-15 RX ORDER — PROPOFOL 10 MG/ML
INJECTION, EMULSION INTRAVENOUS PRN
Status: DISCONTINUED | OUTPATIENT
Start: 2022-04-15 | End: 2022-04-15

## 2022-04-15 RX ORDER — LIDOCAINE 40 MG/G
CREAM TOPICAL
Status: DISCONTINUED | OUTPATIENT
Start: 2022-04-15 | End: 2022-04-15 | Stop reason: HOSPADM

## 2022-04-15 RX ORDER — ACETAMINOPHEN 325 MG/1
975 TABLET ORAL ONCE
Status: COMPLETED | OUTPATIENT
Start: 2022-04-15 | End: 2022-04-15

## 2022-04-15 RX ORDER — HYDROMORPHONE HCL IN WATER/PF 6 MG/30 ML
0.2 PATIENT CONTROLLED ANALGESIA SYRINGE INTRAVENOUS EVERY 5 MIN PRN
Status: DISCONTINUED | OUTPATIENT
Start: 2022-04-15 | End: 2022-04-15 | Stop reason: HOSPADM

## 2022-04-15 RX ORDER — KETOROLAC TROMETHAMINE 30 MG/ML
INJECTION, SOLUTION INTRAMUSCULAR; INTRAVENOUS PRN
Status: DISCONTINUED | OUTPATIENT
Start: 2022-04-15 | End: 2022-04-15

## 2022-04-15 RX ORDER — PROPOFOL 10 MG/ML
INJECTION, EMULSION INTRAVENOUS CONTINUOUS PRN
Status: DISCONTINUED | OUTPATIENT
Start: 2022-04-15 | End: 2022-04-15

## 2022-04-15 RX ORDER — IBUPROFEN 800 MG/1
800 TABLET, FILM COATED ORAL ONCE
Status: DISCONTINUED | OUTPATIENT
Start: 2022-04-15 | End: 2022-04-15 | Stop reason: HOSPADM

## 2022-04-15 RX ORDER — MEPERIDINE HYDROCHLORIDE 25 MG/ML
12.5 INJECTION INTRAMUSCULAR; INTRAVENOUS; SUBCUTANEOUS
Status: DISCONTINUED | OUTPATIENT
Start: 2022-04-15 | End: 2022-04-15 | Stop reason: HOSPADM

## 2022-04-15 RX ORDER — ONDANSETRON 2 MG/ML
INJECTION INTRAMUSCULAR; INTRAVENOUS PRN
Status: DISCONTINUED | OUTPATIENT
Start: 2022-04-15 | End: 2022-04-15

## 2022-04-15 RX ADMIN — LIDOCAINE HYDROCHLORIDE 50 MG: 10 INJECTION, SOLUTION EPIDURAL; INFILTRATION; INTRACAUDAL; PERINEURAL at 12:54

## 2022-04-15 RX ADMIN — ONDANSETRON HYDROCHLORIDE 4 MG: 2 INJECTION, SOLUTION INTRAVENOUS at 13:26

## 2022-04-15 RX ADMIN — SODIUM CHLORIDE, POTASSIUM CHLORIDE, SODIUM LACTATE AND CALCIUM CHLORIDE: 600; 310; 30; 20 INJECTION, SOLUTION INTRAVENOUS at 12:02

## 2022-04-15 RX ADMIN — KETOROLAC TROMETHAMINE 30 MG: 30 INJECTION, SOLUTION INTRAMUSCULAR at 13:25

## 2022-04-15 RX ADMIN — PROPOFOL 75 MCG/KG/MIN: 10 INJECTION, EMULSION INTRAVENOUS at 12:58

## 2022-04-15 RX ADMIN — HYDROMORPHONE HYDROCHLORIDE 0.5 MG: 1 INJECTION, SOLUTION INTRAMUSCULAR; INTRAVENOUS; SUBCUTANEOUS at 13:20

## 2022-04-15 RX ADMIN — PROPOFOL 200 MG: 10 INJECTION, EMULSION INTRAVENOUS at 12:54

## 2022-04-15 RX ADMIN — MIDAZOLAM 2 MG: 1 INJECTION INTRAMUSCULAR; INTRAVENOUS at 12:47

## 2022-04-15 RX ADMIN — ACETAMINOPHEN 975 MG: 325 TABLET, FILM COATED ORAL at 11:50

## 2022-04-15 RX ADMIN — DOXYCYCLINE HYCLATE 100 MG: 100 CAPSULE ORAL at 11:50

## 2022-04-15 RX ADMIN — HYDROMORPHONE HYDROCHLORIDE 0.5 MG: 1 INJECTION, SOLUTION INTRAMUSCULAR; INTRAVENOUS; SUBCUTANEOUS at 13:10

## 2022-04-15 RX ADMIN — DEXAMETHASONE SODIUM PHOSPHATE 4 MG: 4 INJECTION, SOLUTION INTRA-ARTICULAR; INTRALESIONAL; INTRAMUSCULAR; INTRAVENOUS; SOFT TISSUE at 12:54

## 2022-04-15 RX ADMIN — FENTANYL CITRATE 100 MCG: 50 INJECTION, SOLUTION INTRAMUSCULAR; INTRAVENOUS at 12:54

## 2022-04-15 RX ADMIN — GLYCOPYRROLATE 0.2 MG: 0.2 INJECTION, SOLUTION INTRAMUSCULAR; INTRAVENOUS at 12:54

## 2022-04-15 RX ADMIN — SODIUM CHLORIDE, POTASSIUM CHLORIDE, SODIUM LACTATE AND CALCIUM CHLORIDE: 600; 310; 30; 20 INJECTION, SOLUTION INTRAVENOUS at 13:27

## 2022-04-15 RX ADMIN — ONDANSETRON 4 MG: 2 INJECTION INTRAMUSCULAR; INTRAVENOUS at 15:31

## 2022-04-15 RX ADMIN — METHYLERGONOVINE MALEATE 200 MCG: 0.2 INJECTION INTRAVENOUS at 13:22

## 2022-04-15 NOTE — ANESTHESIA PREPROCEDURE EVALUATION
Anesthesia Pre-Procedure Evaluation    Patient: Titus Arnold   MRN: [id number] : 1995        Procedure : Procedure(s):  DILATION AND CURETTAGE, UTERUS, USING SUCTION          Past Medical History:   Diagnosis Date     Anemia      Beta thalassemia (H)      Clinical diagnosis of COVID-19 2020     Gestational diabetes mellitus, class A2 2015     Labial cyst 2020     Obesity       Past Surgical History:   Procedure Laterality Date      SECTION N/A 2015    Procedure:  SECTION;  Surgeon: Rocio Braga MD;  Location: [facility] OR     GYN SURGERY      c/s in  for baby B      No Known Allergies   Social History     Tobacco Use     Smoking status: Never Smoker     Smokeless tobacco: Never Used   Substance Use Topics     Alcohol use: No      Wt Readings from Last 1 Encounters:   04/15/22 96.8 kg (213 lb 4.8 oz)        Anesthesia Evaluation   Pt has had prior anesthetic. Type: General.        ROS/MED HX  ENT/Pulmonary:  - neg pulmonary ROS     Neurologic:  - neg neurologic ROS     Cardiovascular:     (+) hypertension-----    METS/Exercise Tolerance:     Hematologic: Comments: Beta thalassemia     Lab Test        01/20/21     01/19/21     01/19/21     08/27/15     08/27/15     08/26/15                       0629          1234          1012          0715          0140          1730          WBC          10.7          --          6.9           --          13.8*        18.6*         HGB          9.2*         9.5*         9.4*           < >        9.5*         7.7*          MCV          76*           --          77*           --          74*          73*           PLT          220           --          225           --          158          172           INR           --           --           --           --           --          1.12           < > = values in this interval not displayed.                  Lab Test        01/20/21     01/19/21     08/21/15                        0629          1012          1905          NA            --           --          136           POTASSIUM     --           --          3.9           CHLORIDE      --           --          103           CO2           --           --          23            BUN           --           --          4*            CR           0.46*        0.43*        0.48*         ANIONGAP      --           --          10            SHAJI           --           --          8.8           GLC           --           --          78                (+) anemia,     Musculoskeletal:  - neg musculoskeletal ROS     GI/Hepatic:  - neg GI/hepatic ROS     Renal/Genitourinary:  - neg Renal ROS     Endo:     (+) type II DM, Not using insulin, - not using insulin pump. Obesity,     Psychiatric/Substance Use:  - neg psychiatric ROS     Infectious Disease:  - neg infectious disease ROS     Malignancy:  - neg malignancy ROS     Other:  - neg other ROS          Physical Exam    Airway        Mallampati: II   TM distance: > 3 FB   Neck ROM: full   Mouth opening: > 3 cm    Respiratory Devices and Support         Dental  no notable dental history         Cardiovascular   cardiovascular exam normal          Pulmonary   pulmonary exam normal                OUTSIDE LABS:  CBC:   Lab Results   Component Value Date    WBC 10.7 01/20/2021    WBC 6.9 01/19/2021    HGB 9.2 (L) 01/20/2021    HGB 9.5 (L) 01/19/2021    HCT 29.0 (L) 01/20/2021    HCT 30.2 (L) 01/19/2021     01/20/2021     01/19/2021     BMP:   Lab Results   Component Value Date     08/21/2015    POTASSIUM 3.9 08/21/2015    CHLORIDE 103 08/21/2015    CO2 23 08/21/2015    BUN 4 (L) 08/21/2015    CR 0.46 (L) 01/20/2021    CR 0.43 (L) 01/19/2021    GLC 78 08/21/2015     COAGS:   Lab Results   Component Value Date    PTT 33 08/26/2015    INR 1.12 08/26/2015    FIBR 375 08/26/2015     POC:   Lab Results   Component Value Date     (H) 08/26/2015    HCG Negative 02/04/2015      HEPATIC:   Lab Results   Component Value Date    ALT 22 01/20/2021    AST 32 01/20/2021     OTHER:   Lab Results   Component Value Date    SHAJI 8.8 08/21/2015       Anesthesia Plan    ASA Status:  2      Anesthesia Type: General.     - Airway: LMA   Induction: Intravenous, Propofol.   Maintenance: Balanced.        Consents    Anesthesia Plan(s) and associated risks, benefits, and realistic alternatives discussed. Questions answered and patient/representative(s) expressed understanding.    - Discussed:     - Discussed with:  Patient      - Extended Intubation/Ventilatory Support Discussed: No.      - Patient is DNR/DNI Status: No    Use of blood products discussed: Yes.     - Discussed with: Patient.     - Consented: consented to blood products            Reason for refusal: other.     Postoperative Care    Pain management: IV analgesics.   PONV prophylaxis: Ondansetron (or other 5HT-3), Dexamethasone or Solumedrol     Comments:                Stevie Wynn MD

## 2022-04-15 NOTE — ANESTHESIA CARE TRANSFER NOTE
Patient: Titus Arnold    Procedure: Procedure(s):  DILATION AND CURETTAGE, UTERUS, USING SUCTION       Diagnosis: Missed ab [O02.1]  Diagnosis Additional Information: No value filed.    Anesthesia Type:   General     Note:    Oropharynx: oropharynx clear of all foreign objects  Level of Consciousness: awake  Oxygen Supplementation: face mask  Level of Supplemental Oxygen (L/min / FiO2): 6 lpm  Independent Airway: airway patency satisfactory and stable  Dentition: dentition unchanged  Vital Signs Stable: post-procedure vital signs reviewed and stable  Report to RN Given: handoff report given  Patient transferred to: PACU  Comments: Patient with spontaneous respirations and adequate tidal volumes. Patient awake and responsive. LMA removed in OR to 6L facemask. To PACU ventilating well. VSS. Report given.    Handoff Report: Identifed the Patient, Identified the Reponsible Provider, Reviewed the pertinent medical history, Discussed the surgical course, Reviewed Intra-OP anesthesia mangement and issues during anesthesia, Set expectations for post-procedure period and Allowed opportunity for questions and acknowledgement of understanding      Vitals:  Vitals Value Taken Time   /89 04/15/22 1335   Temp     Pulse 89 04/15/22 1338   Resp 21 04/15/22 1338   SpO2 100 % 04/15/22 1338   Vitals shown include unvalidated device data.    Electronically Signed By: LETY Lyle CRNA  April 15, 2022  1:39 PM

## 2022-04-15 NOTE — ANESTHESIA POSTPROCEDURE EVALUATION
Patient: Titus Arnold    Procedure: Procedure(s):  DILATION AND CURETTAGE, UTERUS, USING SUCTION       Anesthesia Type:  General    Note:     Postop Pain Control: Uneventful            Sign Out: Well controlled pain   PONV: No   Neuro/Psych: Uneventful            Sign Out: Acceptable/Baseline neuro status   Airway/Respiratory: Uneventful            Sign Out: Acceptable/Baseline resp. status   CV/Hemodynamics: Uneventful            Sign Out: Acceptable CV status; No obvious hypovolemia; No obvious fluid overload   Other NRE: NONE   DID A NON-ROUTINE EVENT OCCUR? No           Last vitals:  Vitals Value Taken Time   /74 04/15/22 1350   Temp 98.1  F (36.7  C) 04/15/22 1334   Pulse 85 04/15/22 1402   Resp 18 04/15/22 1402   SpO2 98 % 04/15/22 1402   Vitals shown include unvalidated device data.    Electronically Signed By: Stevie Wynn MD  April 15, 2022  2:03 PM

## 2022-04-15 NOTE — OP NOTE
Surgery Date:  4/15/2022    Primary Surgeon:    Shital Gauthier MD    Assistants:   none    Post-op Diagnosis:    26 year old   with missed miscarriage    Procedure:    Cervical dilation and suction curettage.    EBL:  100 mL    Anesthesia: GET    UOP: 500mL    Specimens:  Products of conception    Complications / Findings:  1.  No apparent complications.  2.  anteverted uterus.  3.  No signs of perforation and gentle curettage at end of procedure confirmed no retained products    Indications: Titus Arnold is a 26 year old yo  who was unfortunately diagnosed with a missed .  We discussed management options and she chose to proceed with D&C.  Risks of infection, bleeding, uterine perforation were discussed and informed consent was obtained.  She is Rh positive.    Procedure:  She was taken to the OR with IV running and GET anesthesia was administered.  She was prepped and draped and a 'time out' was held to confirm the procedure.  Exam under anesthesia was performed with the above findings.  A speculum was inserted and  anterior lip of the cervix was then grasped with a single tooth tenaculum.  Cervix was gently dilated to 14 with Russ dilators.  A 12mm rigid curette was used after testing for appropriate suction to empty the uterus of products of conception which were grossly seen through the tubing.  A gentle sharp curettage was then done and a smooth sensation was noted circumferentially.  One more pass with the suction curette was then made.  All instruments were removed from her vagina and the case was complete.  No apparent complications and she was transferred to the PACU in stable condition. Methergine IM x1 was given, to assist with hemostasis.    Shital Gauthier MD on 4/15/2022 at 1:47 PM

## 2022-04-15 NOTE — H&P
OB History and Physical    History  Patient's last menstrual period was 2022 (approximate).  Estimated Date of Delivery: 10/21/22   Gestational age: 12w4d   Obstetric History:       Brief HPI:  Titus Arnold is a 26 y.o.  at 12w4d by LMP, who presents with missed miscarriage at 10w6 by CRL, 12w4 by LMP. She denies contractions, vaginal bleeding, and loss of fluid. She has been counseled about the options for watchful waiting, misoprostol, or d&c, and, due to advanced gestation, opts for D&C. Risks, benefits, alternatives, and procedure have been reviewed. She has been reassured that she is not to blame for this loss.     ROS: She denies headache, blurry vision, chest pain, shortness of breath, RUQ pain, nausea, vomiting, dysuria, or extremity edema.    Pregnancy Notable for  - missed miscarriage        Prenatal Labs  Blood Type   Date Value Ref Range Status   2015 O POS Final     Hemoglobin   Date Value Ref Range Status   2022 9.2 (L) 12.0 - 15.5 g/dL Final   2017 9.8 (L) 11.8 - 15.5 g/dL Final     Platelets   Date Value Ref Range Status   2022 185 150 - 450 x10(9)/L Final     Platelet Count   Date Value Ref Range Status   2017 227 140 - 450 k/cmm Final     Hepatitis B Surface Antigen   Date Value Ref Range Status   2022 Negative (Non Reactive) Negative (Non Reactive) Final     Hep B Surf Ag   Date Value Ref Range Status   2015 Negative Negative Final     Rubella IgG   Date Value Ref Range Status   2015 Immune Immune Final     Rubella Intepretation   Date Value Ref Range Status   2022 Immune Immune Final     HIV 1/HIV 2   Date Value Ref Range Status   2015 Non-React Non-Reactive Final     HIV 1/2 Antigen/Antibody (4th generation)   Date Value Ref Range Status   2022 Negative (Non Reactive) Negative (Non Reactive) Final   Comment:   HIV-1 p24 Antigen and HIV-1/HIV-2 Antibody not detected     Weight          Ultrasounds  Dating  Summary   Working JENNY: 10/21/22 set by Jaida Ash MD on 22 based on Last Menstrual Period on 22       Based On JENNY GA Diff GA User Date   Last Menstrual Period on 22 (Approximate)  10/21/22 Working Jaida Ash MD 22       OB History  OB History    Para Term  AB Living   4 3 2 1 0 4   SAB IAB Ectopic Multiple Live Births   0 0 0 1 4     # Outcome Date GA Lbr Krish/2nd Weight Sex Delivery Anes PTL Lv   4 Current   3 Term 21 37w1d 03:36 / 00:01 7 lb 1.9 oz (3.23 kg) F  N SHANE   Complications: Pre-eclampsia, History of  section, Antepartum anemia   Name: HALEY,FEMALE-SAKINA   Apgar1: 9 Apgar5: 9   2A  08/26/15 31w5d 00:20 / 00:03 3 lb 10.6 oz (1.66 kg) F Vag-Spont None Y SHANE   Complications: Chorioamnionitis, Other (comment), Gestational diabetes mellitus, class A2, Precipitate labor, delivered, current hospitalization, Dichorionic diamniotic twin pregnancy in third trimester,  premature rupture of membranes (PPROM) with onset of labor after 24 hours of rupture in first trimester, antepartum,  labor in second trimester with  delivery in third trimester, fetus 1, Premature rupture of membranes during pregnancy, delivered   Apgar1: 6 Apgar5: 8   2B  08/26/15 31w5d 00:20  00:15 4 lb 3.4 oz (1.91 kg) M CS-LTranv Gen Y SHANE   Complications: Chorioamnionitis, Other (comment), Abruptio Placenta, Gestational diabetes mellitus, class A2, Dichorionic diamniotic twin pregnancy in third trimester, Transverse lie of fetus, fetus 2, Abruptio placenta, third trimester,  premature rupture of membranes (PPROM) with onset of labor after 24 hours of rupture in first trimester, antepartum, Premature rupture of membranes during pregnancy, delivered, Transverse lie of fetus   Apgar1: 2 Apgar5: 7   1 Term 10/2006 40w0d F Vag-Spont None N SHANE   Complications: Adolescent pregnancy   Name: elver     Hospitalized Problems:  Active  Problems:  * No active hospital problems. *      PMHx:  Past Medical History:   Diagnosis Date     Beta thalassemia trait 2015     Gestational diabetes mellitus, class A2 2015   During Twin Preg     Labial cyst 2020     PSHx:  Past Surgical History:   Procedure Laterality Date      SECTION 8/26/15   Emergent LTCS for second twin ([facility])     Meds:  (Not in a hospital admission)  none    Allergies:  No Known Allergies   NKMA    FmHx:  No family history on file.    SocHx: She denies any tobacco, alcohol, or other drug use during this pregnancy.  Social History     Tobacco Use     Smoking status: Never Smoker     Smokeless tobacco: Never Used   Vaping Use     Vaping Use: Never used   Substance Use Topics     Alcohol use: No     Drug use: No     Physical Exam  Vitals:   Patient Vitals for the past 4 hrs:  BP Pulse Weight   22 1554 127/76 63 --   22 1552 (!) 140/76 65 212 lb (96.2 kg)     Gen: A&Ox4, NAD  CV: RRR, well perfused  Resp: CTAB, no wheezes, rales, rhonchi  Abd: nontender  Ext: trace edema    Assessment/Plan:  Titus Arnold is a 26 y.o. , at 12w4d by LMP, who presents with missed miscarriage.    Missed miscarriage  -proceed with suction D&C  -O+  -desires chromosomal testing    Shital Gauthier MD 4:49 PM 2022

## 2022-04-15 NOTE — ANESTHESIA PROCEDURE NOTES
Airway         Procedure Start/Stop Times: 4/15/2022 12:56 PM  Staff -        Anesthesiologist:  Stevie Wynn MD       CRNA: Micheal Renteria APRN CRNA       Performed By: CRNA  Consent for Airway        Urgency: elective  Indications and Patient Condition       Indications for airway management: thompson-procedural       Induction type:intravenous       Mask difficulty assessment: 1 - vent by mask    Final Airway Details       Final airway type: supraglottic airway    Supraglottic Airway Details        Type: LMA       Brand: I-Gel       LMA size: 4    Post intubation assessment        Placement verified by: capnometry, equal breath sounds and chest rise        Number of attempts at approach: 1       Number of other approaches attempted: 0       Secured with: commercial tube felder       Ease of procedure: easy       Dentition: Intact and Unchanged    Medication(s) Administered   Medication Administration Time: 4/15/2022 12:56 PM

## 2022-04-15 NOTE — LETTER
[facility] POST ANESTHESIA CARE  [address] [place] [facility] [address]  [phone]          April 15, 2022    RE:  Loob Pierre                                                                                                                                                      [address]  [place] [address]            To whom it may concern:    Mr. Pierre has had a family emergency, requiring his absence from 4/12/22 until 4/18/22. He is able to return to full work duties, without restrictions, as of 4/19/2022.     Please do not hesitate to contact us with questions or concerns.      Respectfully,             Shital Gauthier MD on 4/15/2022 at 1:43 PM

## 2022-04-15 NOTE — INTERVAL H&P NOTE
"I have reviewed the surgical (or preoperative) H&P that is linked to this encounter, and examined the patient. There are no significant changes    Clinical Conditions Present on Arrival:  Clinically Significant Risk Factors Present on Admission                   # Obesity: Estimated body mass index is 34.43 kg/m  as calculated from the following:    Height as of this encounter: 1.676 m (5' 6\").    Weight as of this encounter: 96.8 kg (213 lb 4.8 oz).       "

## 2022-04-15 NOTE — DISCHARGE INSTRUCTIONS
DILATION AND CURETTAGE    AFTER YOUR DILATION AND CURETTAGE  -  You can expect some cramping for a few hours after the D & C.  This can be controlled with an over-the-counter pain reliever.  -  You may have some light bleeding for a few weeks.  Use pads instead of tampons.  -  Take showers instead of baths for about a week.  Ask your healthcare provider if you should avoid exercising or having sex for a period of time.      WHEN TO CALL YOUR HEALTHCARE PROVIDER    -  Heavy bleeding (more than 1 pad an hour).  -  A fever of 100.4 F (38 C) or higher, or as directed by your healthcare provider.  -  Increasing belly pain, tenderness, or cramping.  -  Foul-smelling discharge.       GENERAL ANESTHESIA OR SEDATION ADULT DISCHARGE INSTRUCTIONS   SPECIAL PRECAUTIONS FOR 24 HOURS AFTER SURGERY    IT IS NOT UNUSUAL TO FEEL LIGHT-HEADED OR FAINT, UP TO 24 HOURS AFTER SURGERY OR WHILE TAKING PAIN MEDICATION.  IF YOU HAVE THESE SYMPTOMS; SIT FOR A FEW MINUTES BEFORE STANDING AND HAVE SOMEONE ASSIST YOU WHEN YOU GET UP TO WALK OR USE THE BATHROOM.    YOU SHOULD REST AND RELAX FOR THE NEXT 24 HOURS AND YOU MUST MAKE ARRANGEMENTS TO HAVE SOMEONE STAY WITH YOU FOR AT LEAST 24 HOURS AFTER YOUR DISCHARGE.  AVOID HAZARDOUS AND STRENUOUS ACTIVITIES.  DO NOT MAKE IMPORTANT DECISIONS FOR 24 HOURS.    DO NOT DRIVE ANY VEHICLE OR OPERATE MECHANICAL EQUIPMENT FOR 24 HOURS FOLLOWING THE END OF YOUR SURGERY.  EVEN THOUGH YOU MAY FEEL NORMAL, YOUR REACTIONS MAY BE AFFECTED BY THE MEDICATION YOU HAVE RECEIVED.    DO NOT DRINK ALCOHOLIC BEVERAGES FOR 24 HOURS FOLLOWING YOUR SURGERY.    DRINK CLEAR LIQUIDS (APPLE JUICE, GINGER ALE, 7-UP, BROTH, ETC.).  PROGRESS TO YOUR REGULAR DIET AS YOU FEEL ABLE.    YOU MAY HAVE A DRY MOUTH, A SORE THROAT, MUSCLES ACHES OR TROUBLE SLEEPING.  THESE SHOULD GO AWAY AFTER 24 HOURS.    CALL YOUR DOCTOR FOR ANY OF THE FOLLOWING:  SIGNS OF INFECTION (FEVER, GROWING TENDERNESS AT THE SURGERY SITE, A LARGE AMOUNT OF  DRAINAGE OR BLEEDING, SEVERE PAIN, FOUL-SMELLING DRAINAGE, REDNESS OR SWELLING.    IT HAS BEEN OVER 8 TO 10 HOURS SINCE SURGERY AND YOU ARE STILL NOT ABLE TO URINATE (PASS WATER).       Maximum acetaminophen (Tylenol) dose from all sources should not exceed 4 grams (4000 mg) per day. Next Dose at 6 PM.    You received Toradol, an IV form of Ibuprofen (Motrin) at 1:30 PM.  Do not take any Ibuprofen products until 7:30 PM.

## 2022-04-18 LAB
PATH REPORT.COMMENTS IMP SPEC: NORMAL
PATH REPORT.FINAL DX SPEC: NORMAL
PATH REPORT.GROSS SPEC: NORMAL
PATH REPORT.MICROSCOPIC SPEC OTHER STN: NORMAL
PATH REPORT.RELEVANT HX SPEC: NORMAL
PHOTO IMAGE: NORMAL

## 2022-05-15 ENCOUNTER — HEALTH MAINTENANCE LETTER (OUTPATIENT)
Age: 27
End: 2022-05-15

## 2022-09-11 ENCOUNTER — HEALTH MAINTENANCE LETTER (OUTPATIENT)
Age: 27
End: 2022-09-11

## 2023-04-03 ENCOUNTER — HOSPITAL ENCOUNTER (INPATIENT)
Facility: CLINIC | Age: 28
Setting detail: SURGERY ADMIT
End: 2023-04-03
Attending: OBSTETRICS & GYNECOLOGY | Admitting: OBSTETRICS & GYNECOLOGY
Payer: COMMERCIAL

## 2023-04-19 RX ORDER — ASPIRIN 81 MG/1
81 TABLET ORAL DAILY
COMMUNITY
End: 2023-04-24 | Stop reason: HOSPADM

## 2023-04-24 ENCOUNTER — ANESTHESIA (OUTPATIENT)
Dept: SURGERY | Facility: CLINIC | Age: 28
End: 2023-04-24
Payer: COMMERCIAL

## 2023-04-24 ENCOUNTER — HOSPITAL ENCOUNTER (INPATIENT)
Facility: CLINIC | Age: 28
LOS: 5 days | Discharge: HOME | End: 2023-04-29
Attending: OBSTETRICS & GYNECOLOGY | Admitting: OBSTETRICS & GYNECOLOGY
Payer: COMMERCIAL

## 2023-04-24 ENCOUNTER — ANESTHESIA EVENT (OUTPATIENT)
Dept: SURGERY | Facility: CLINIC | Age: 28
End: 2023-04-24
Payer: COMMERCIAL

## 2023-04-24 DIAGNOSIS — Z98.891 S/P C-SECTION: Primary | ICD-10-CM

## 2023-04-24 DIAGNOSIS — O34.219 VBAC (VAGINAL BIRTH AFTER CESAREAN): ICD-10-CM

## 2023-04-24 DIAGNOSIS — O14.90 PRE-ECLAMPSIA, ANTEPARTUM: ICD-10-CM

## 2023-04-24 LAB
ABO/RH(D): NORMAL
ALBUMIN MFR UR ELPH: 149 MG/DL (ref 1–14)
ALBUMIN SERPL BCG-MCNC: 3.8 G/DL (ref 3.5–5.2)
ALP SERPL-CCNC: 143 U/L (ref 35–104)
ALT SERPL W P-5'-P-CCNC: 27 U/L (ref 10–35)
ANION GAP SERPL CALCULATED.3IONS-SCNC: 13 MMOL/L (ref 7–15)
ANTIBODY SCREEN: NEGATIVE
AST SERPL W P-5'-P-CCNC: 41 U/L (ref 10–35)
BILIRUB SERPL-MCNC: 0.5 MG/DL
BUN SERPL-MCNC: 4.7 MG/DL (ref 6–20)
CALCIUM SERPL-MCNC: 9.3 MG/DL (ref 8.6–10)
CHLORIDE SERPL-SCNC: 104 MMOL/L (ref 98–107)
CREAT SERPL-MCNC: 0.43 MG/DL (ref 0.51–0.95)
CREAT SERPL-MCNC: 0.43 MG/DL (ref 0.51–0.95)
CREAT UR-MCNC: 122.3 MG/DL
DEPRECATED HCO3 PLAS-SCNC: 22 MMOL/L (ref 22–29)
ERYTHROCYTE [DISTWIDTH] IN BLOOD BY AUTOMATED COUNT: 15.6 % (ref 10–15)
GFR SERPL CREATININE-BSD FRML MDRD: >90 ML/MIN/1.73M2
GFR SERPL CREATININE-BSD FRML MDRD: >90 ML/MIN/1.73M2
GLUCOSE BLDC GLUCOMTR-MCNC: 79 MG/DL (ref 70–99)
GLUCOSE SERPL-MCNC: 69 MG/DL (ref 70–99)
HCT VFR BLD AUTO: 32.3 % (ref 35–47)
HGB BLD-MCNC: 10.1 G/DL (ref 11.7–15.7)
MCH RBC QN AUTO: 23.6 PG (ref 26.5–33)
MCHC RBC AUTO-ENTMCNC: 31.3 G/DL (ref 31.5–36.5)
MCV RBC AUTO: 76 FL (ref 78–100)
PLATELET # BLD AUTO: 231 10E3/UL (ref 150–450)
POTASSIUM SERPL-SCNC: 3.9 MMOL/L (ref 3.4–5.3)
PROT SERPL-MCNC: 7.6 G/DL (ref 6.4–8.3)
PROT/CREAT 24H UR: 1.22 MG/MG CR (ref 0–0.2)
RBC # BLD AUTO: 4.28 10E6/UL (ref 3.8–5.2)
SODIUM SERPL-SCNC: 139 MMOL/L (ref 136–145)
SPECIMEN EXPIRATION DATE: NORMAL
WBC # BLD AUTO: 8.2 10E3/UL (ref 4–11)

## 2023-04-24 PROCEDURE — 84156 ASSAY OF PROTEIN URINE: CPT | Performed by: OBSTETRICS & GYNECOLOGY

## 2023-04-24 PROCEDURE — 250N000011 HC RX IP 250 OP 636: Performed by: OBSTETRICS & GYNECOLOGY

## 2023-04-24 PROCEDURE — 258N000003 HC RX IP 258 OP 636: Performed by: NURSE ANESTHETIST, CERTIFIED REGISTERED

## 2023-04-24 PROCEDURE — 88307 TISSUE EXAM BY PATHOLOGIST: CPT | Mod: TC | Performed by: OBSTETRICS & GYNECOLOGY

## 2023-04-24 PROCEDURE — 360N000076 HC SURGERY LEVEL 3, PER MIN: Performed by: OBSTETRICS & GYNECOLOGY

## 2023-04-24 PROCEDURE — 120N000001 HC R&B MED SURG/OB

## 2023-04-24 PROCEDURE — 36415 COLL VENOUS BLD VENIPUNCTURE: CPT | Performed by: OBSTETRICS & GYNECOLOGY

## 2023-04-24 PROCEDURE — 85027 COMPLETE CBC AUTOMATED: CPT | Performed by: OBSTETRICS & GYNECOLOGY

## 2023-04-24 PROCEDURE — 258N000003 HC RX IP 258 OP 636: Performed by: OBSTETRICS & GYNECOLOGY

## 2023-04-24 PROCEDURE — 250N000011 HC RX IP 250 OP 636: Performed by: NURSE ANESTHETIST, CERTIFIED REGISTERED

## 2023-04-24 PROCEDURE — 250N000009 HC RX 250: Performed by: OBSTETRICS & GYNECOLOGY

## 2023-04-24 PROCEDURE — 80053 COMPREHEN METABOLIC PANEL: CPT | Performed by: OBSTETRICS & GYNECOLOGY

## 2023-04-24 PROCEDURE — 88307 TISSUE EXAM BY PATHOLOGIST: CPT | Mod: 26 | Performed by: PATHOLOGY

## 2023-04-24 PROCEDURE — 82962 GLUCOSE BLOOD TEST: CPT

## 2023-04-24 PROCEDURE — 86780 TREPONEMA PALLIDUM: CPT | Performed by: OBSTETRICS & GYNECOLOGY

## 2023-04-24 PROCEDURE — 272N000001 HC OR GENERAL SUPPLY STERILE: Performed by: OBSTETRICS & GYNECOLOGY

## 2023-04-24 PROCEDURE — 710N000009 HC RECOVERY PHASE 1, LEVEL 1, PER MIN: Performed by: OBSTETRICS & GYNECOLOGY

## 2023-04-24 PROCEDURE — 250N000009 HC RX 250: Performed by: NURSE ANESTHETIST, CERTIFIED REGISTERED

## 2023-04-24 PROCEDURE — 250N000013 HC RX MED GY IP 250 OP 250 PS 637: Performed by: OBSTETRICS & GYNECOLOGY

## 2023-04-24 PROCEDURE — 370N000017 HC ANESTHESIA TECHNICAL FEE, PER MIN: Performed by: OBSTETRICS & GYNECOLOGY

## 2023-04-24 PROCEDURE — 86901 BLOOD TYPING SEROLOGIC RH(D): CPT | Performed by: OBSTETRICS & GYNECOLOGY

## 2023-04-24 RX ORDER — KETOROLAC TROMETHAMINE 30 MG/ML
INJECTION, SOLUTION INTRAMUSCULAR; INTRAVENOUS PRN
Status: DISCONTINUED | OUTPATIENT
Start: 2023-04-24 | End: 2023-04-24

## 2023-04-24 RX ORDER — OXYTOCIN/0.9 % SODIUM CHLORIDE 30/500 ML
PLASTIC BAG, INJECTION (ML) INTRAVENOUS PRN
Status: DISCONTINUED | OUTPATIENT
Start: 2023-04-24 | End: 2023-04-24

## 2023-04-24 RX ORDER — ONDANSETRON 4 MG/1
4 TABLET, ORALLY DISINTEGRATING ORAL EVERY 30 MIN PRN
Status: DISCONTINUED | OUTPATIENT
Start: 2023-04-24 | End: 2023-04-24 | Stop reason: HOSPADM

## 2023-04-24 RX ORDER — OXYCODONE HYDROCHLORIDE 5 MG/1
5 TABLET ORAL EVERY 4 HOURS PRN
Status: DISCONTINUED | OUTPATIENT
Start: 2023-04-24 | End: 2023-04-29 | Stop reason: HOSPADM

## 2023-04-24 RX ORDER — CARBOPROST TROMETHAMINE 250 UG/ML
250 INJECTION, SOLUTION INTRAMUSCULAR
Status: DISCONTINUED | OUTPATIENT
Start: 2023-04-24 | End: 2023-04-24 | Stop reason: HOSPADM

## 2023-04-24 RX ORDER — BUPIVACAINE HYDROCHLORIDE 7.5 MG/ML
INJECTION, SOLUTION INTRASPINAL PRN
Status: DISCONTINUED | OUTPATIENT
Start: 2023-04-24 | End: 2023-04-24

## 2023-04-24 RX ORDER — AMOXICILLIN 250 MG
1 CAPSULE ORAL 2 TIMES DAILY
Status: DISCONTINUED | OUTPATIENT
Start: 2023-04-24 | End: 2023-04-29 | Stop reason: HOSPADM

## 2023-04-24 RX ORDER — PROCHLORPERAZINE 25 MG
25 SUPPOSITORY, RECTAL RECTAL EVERY 12 HOURS PRN
Status: DISCONTINUED | OUTPATIENT
Start: 2023-04-24 | End: 2023-04-29 | Stop reason: HOSPADM

## 2023-04-24 RX ORDER — MAGNESIUM HYDROXIDE 1200 MG/15ML
LIQUID ORAL PRN
Status: DISCONTINUED | OUTPATIENT
Start: 2023-04-24 | End: 2023-04-29 | Stop reason: HOSPADM

## 2023-04-24 RX ORDER — IBUPROFEN 800 MG/1
800 TABLET, FILM COATED ORAL EVERY 6 HOURS
Status: DISCONTINUED | OUTPATIENT
Start: 2023-04-25 | End: 2023-04-29 | Stop reason: HOSPADM

## 2023-04-24 RX ORDER — NALOXONE HYDROCHLORIDE 0.4 MG/ML
0.2 INJECTION, SOLUTION INTRAMUSCULAR; INTRAVENOUS; SUBCUTANEOUS
Status: DISCONTINUED | OUTPATIENT
Start: 2023-04-24 | End: 2023-04-29 | Stop reason: HOSPADM

## 2023-04-24 RX ORDER — METOCLOPRAMIDE 10 MG/1
10 TABLET ORAL EVERY 6 HOURS PRN
Status: DISCONTINUED | OUTPATIENT
Start: 2023-04-24 | End: 2023-04-29 | Stop reason: HOSPADM

## 2023-04-24 RX ORDER — PROCHLORPERAZINE MALEATE 10 MG
10 TABLET ORAL EVERY 6 HOURS PRN
Status: DISCONTINUED | OUTPATIENT
Start: 2023-04-24 | End: 2023-04-29 | Stop reason: HOSPADM

## 2023-04-24 RX ORDER — TRANEXAMIC ACID 10 MG/ML
1 INJECTION, SOLUTION INTRAVENOUS EVERY 30 MIN PRN
Status: DISCONTINUED | OUTPATIENT
Start: 2023-04-24 | End: 2023-04-24 | Stop reason: HOSPADM

## 2023-04-24 RX ORDER — SODIUM CHLORIDE, SODIUM LACTATE, POTASSIUM CHLORIDE, CALCIUM CHLORIDE 600; 310; 30; 20 MG/100ML; MG/100ML; MG/100ML; MG/100ML
INJECTION, SOLUTION INTRAVENOUS CONTINUOUS
Status: DISCONTINUED | OUTPATIENT
Start: 2023-04-24 | End: 2023-04-24 | Stop reason: HOSPADM

## 2023-04-24 RX ORDER — NALOXONE HYDROCHLORIDE 0.4 MG/ML
0.4 INJECTION, SOLUTION INTRAMUSCULAR; INTRAVENOUS; SUBCUTANEOUS
Status: DISCONTINUED | OUTPATIENT
Start: 2023-04-24 | End: 2023-04-29 | Stop reason: HOSPADM

## 2023-04-24 RX ORDER — TRANEXAMIC ACID 10 MG/ML
1 INJECTION, SOLUTION INTRAVENOUS EVERY 30 MIN PRN
Status: DISCONTINUED | OUTPATIENT
Start: 2023-04-24 | End: 2023-04-29 | Stop reason: HOSPADM

## 2023-04-24 RX ORDER — ONDANSETRON 4 MG/1
4 TABLET, ORALLY DISINTEGRATING ORAL EVERY 6 HOURS PRN
Status: DISCONTINUED | OUTPATIENT
Start: 2023-04-24 | End: 2023-04-24 | Stop reason: HOSPADM

## 2023-04-24 RX ORDER — MODIFIED LANOLIN
OINTMENT (GRAM) TOPICAL
Status: DISCONTINUED | OUTPATIENT
Start: 2023-04-24 | End: 2023-04-29 | Stop reason: HOSPADM

## 2023-04-24 RX ORDER — LABETALOL HYDROCHLORIDE 5 MG/ML
20-80 INJECTION, SOLUTION INTRAVENOUS EVERY 10 MIN PRN
Status: DISCONTINUED | OUTPATIENT
Start: 2023-04-24 | End: 2023-04-29 | Stop reason: HOSPADM

## 2023-04-24 RX ORDER — ONDANSETRON 2 MG/ML
4 INJECTION INTRAMUSCULAR; INTRAVENOUS EVERY 6 HOURS PRN
Status: DISCONTINUED | OUTPATIENT
Start: 2023-04-24 | End: 2023-04-29 | Stop reason: HOSPADM

## 2023-04-24 RX ORDER — METHYLERGONOVINE MALEATE 0.2 MG/ML
200 INJECTION INTRAVENOUS
Status: DISCONTINUED | OUTPATIENT
Start: 2023-04-24 | End: 2023-04-29 | Stop reason: HOSPADM

## 2023-04-24 RX ORDER — LIDOCAINE 40 MG/G
CREAM TOPICAL
Status: DISCONTINUED | OUTPATIENT
Start: 2023-04-24 | End: 2023-04-24 | Stop reason: HOSPADM

## 2023-04-24 RX ORDER — CITRIC ACID/SODIUM CITRATE 334-500MG
30 SOLUTION, ORAL ORAL
Status: COMPLETED | OUTPATIENT
Start: 2023-04-24 | End: 2023-04-24

## 2023-04-24 RX ORDER — FENTANYL CITRATE-0.9 % NACL/PF 10 MCG/ML
100 PLASTIC BAG, INJECTION (ML) INTRAVENOUS EVERY 5 MIN PRN
Status: DISCONTINUED | OUTPATIENT
Start: 2023-04-24 | End: 2023-04-24 | Stop reason: HOSPADM

## 2023-04-24 RX ORDER — LIDOCAINE 40 MG/G
CREAM TOPICAL
Status: DISCONTINUED | OUTPATIENT
Start: 2023-04-24 | End: 2023-04-29 | Stop reason: HOSPADM

## 2023-04-24 RX ORDER — SODIUM CHLORIDE, SODIUM LACTATE, POTASSIUM CHLORIDE, CALCIUM CHLORIDE 600; 310; 30; 20 MG/100ML; MG/100ML; MG/100ML; MG/100ML
INJECTION, SOLUTION INTRAVENOUS CONTINUOUS PRN
Status: DISCONTINUED | OUTPATIENT
Start: 2023-04-24 | End: 2023-04-24

## 2023-04-24 RX ORDER — MISOPROSTOL 200 UG/1
800 TABLET ORAL
Status: DISCONTINUED | OUTPATIENT
Start: 2023-04-24 | End: 2023-04-29 | Stop reason: HOSPADM

## 2023-04-24 RX ORDER — MISOPROSTOL 200 UG/1
400 TABLET ORAL
Status: DISCONTINUED | OUTPATIENT
Start: 2023-04-24 | End: 2023-04-24 | Stop reason: HOSPADM

## 2023-04-24 RX ORDER — ACETAMINOPHEN 325 MG/1
975 TABLET ORAL EVERY 6 HOURS
Status: DISCONTINUED | OUTPATIENT
Start: 2023-04-24 | End: 2023-04-29 | Stop reason: HOSPADM

## 2023-04-24 RX ORDER — MORPHINE SULFATE 1 MG/ML
INJECTION, SOLUTION EPIDURAL; INTRATHECAL; INTRAVENOUS PRN
Status: DISCONTINUED | OUTPATIENT
Start: 2023-04-24 | End: 2023-04-24

## 2023-04-24 RX ORDER — CEFAZOLIN SODIUM/WATER 2 G/20 ML
2 SYRINGE (ML) INTRAVENOUS
Status: DISCONTINUED | OUTPATIENT
Start: 2023-04-24 | End: 2023-04-24 | Stop reason: HOSPADM

## 2023-04-24 RX ORDER — NALBUPHINE HYDROCHLORIDE 10 MG/ML
2.5-5 INJECTION, SOLUTION INTRAMUSCULAR; INTRAVENOUS; SUBCUTANEOUS EVERY 6 HOURS PRN
Status: DISCONTINUED | OUTPATIENT
Start: 2023-04-24 | End: 2023-04-29 | Stop reason: HOSPADM

## 2023-04-24 RX ORDER — ACETAMINOPHEN 325 MG/1
975 TABLET ORAL ONCE
Status: COMPLETED | OUTPATIENT
Start: 2023-04-24 | End: 2023-04-24

## 2023-04-24 RX ORDER — OXYTOCIN/0.9 % SODIUM CHLORIDE 30/500 ML
340 PLASTIC BAG, INJECTION (ML) INTRAVENOUS CONTINUOUS PRN
Status: DISCONTINUED | OUTPATIENT
Start: 2023-04-24 | End: 2023-04-24 | Stop reason: HOSPADM

## 2023-04-24 RX ORDER — METOCLOPRAMIDE HYDROCHLORIDE 5 MG/ML
10 INJECTION INTRAMUSCULAR; INTRAVENOUS ONCE
Status: COMPLETED | OUTPATIENT
Start: 2023-04-24 | End: 2023-04-24

## 2023-04-24 RX ORDER — SIMETHICONE 80 MG
80 TABLET,CHEWABLE ORAL 4 TIMES DAILY PRN
Status: DISCONTINUED | OUTPATIENT
Start: 2023-04-24 | End: 2023-04-29 | Stop reason: HOSPADM

## 2023-04-24 RX ORDER — ONDANSETRON 2 MG/ML
4 INJECTION INTRAMUSCULAR; INTRAVENOUS EVERY 30 MIN PRN
Status: DISCONTINUED | OUTPATIENT
Start: 2023-04-24 | End: 2023-04-24 | Stop reason: HOSPADM

## 2023-04-24 RX ORDER — CEFAZOLIN SODIUM 1 G/3ML
INJECTION, POWDER, FOR SOLUTION INTRAMUSCULAR; INTRAVENOUS PRN
Status: DISCONTINUED | OUTPATIENT
Start: 2023-04-24 | End: 2023-04-24

## 2023-04-24 RX ORDER — OXYTOCIN 10 [USP'U]/ML
10 INJECTION, SOLUTION INTRAMUSCULAR; INTRAVENOUS
Status: DISCONTINUED | OUTPATIENT
Start: 2023-04-24 | End: 2023-04-29 | Stop reason: HOSPADM

## 2023-04-24 RX ORDER — ONDANSETRON 4 MG/1
4 TABLET, ORALLY DISINTEGRATING ORAL EVERY 6 HOURS PRN
Status: DISCONTINUED | OUTPATIENT
Start: 2023-04-24 | End: 2023-04-29 | Stop reason: HOSPADM

## 2023-04-24 RX ORDER — KETOROLAC TROMETHAMINE 30 MG/ML
30 INJECTION, SOLUTION INTRAMUSCULAR; INTRAVENOUS EVERY 6 HOURS
Status: COMPLETED | OUTPATIENT
Start: 2023-04-25 | End: 2023-04-25

## 2023-04-24 RX ORDER — PROMETHAZINE HYDROCHLORIDE 25 MG/1
25 TABLET ORAL EVERY 4 HOURS PRN
Status: DISCONTINUED | OUTPATIENT
Start: 2023-04-24 | End: 2023-04-29 | Stop reason: HOSPADM

## 2023-04-24 RX ORDER — HYDRALAZINE HYDROCHLORIDE 20 MG/ML
10 INJECTION INTRAMUSCULAR; INTRAVENOUS
Status: DISCONTINUED | OUTPATIENT
Start: 2023-04-24 | End: 2023-04-29 | Stop reason: HOSPADM

## 2023-04-24 RX ORDER — CARBOPROST TROMETHAMINE 250 UG/ML
250 INJECTION, SOLUTION INTRAMUSCULAR
Status: DISCONTINUED | OUTPATIENT
Start: 2023-04-24 | End: 2023-04-29 | Stop reason: HOSPADM

## 2023-04-24 RX ORDER — METOCLOPRAMIDE HYDROCHLORIDE 5 MG/ML
10 INJECTION INTRAMUSCULAR; INTRAVENOUS EVERY 6 HOURS PRN
Status: DISCONTINUED | OUTPATIENT
Start: 2023-04-24 | End: 2023-04-29 | Stop reason: HOSPADM

## 2023-04-24 RX ORDER — DEXAMETHASONE SODIUM PHOSPHATE 4 MG/ML
INJECTION, SOLUTION INTRA-ARTICULAR; INTRALESIONAL; INTRAMUSCULAR; INTRAVENOUS; SOFT TISSUE PRN
Status: DISCONTINUED | OUTPATIENT
Start: 2023-04-24 | End: 2023-04-24

## 2023-04-24 RX ORDER — MISOPROSTOL 200 UG/1
800 TABLET ORAL
Status: DISCONTINUED | OUTPATIENT
Start: 2023-04-24 | End: 2023-04-24 | Stop reason: HOSPADM

## 2023-04-24 RX ORDER — METHYLERGONOVINE MALEATE 0.2 MG/ML
200 INJECTION INTRAVENOUS
Status: DISCONTINUED | OUTPATIENT
Start: 2023-04-24 | End: 2023-04-24 | Stop reason: HOSPADM

## 2023-04-24 RX ORDER — OXYTOCIN/0.9 % SODIUM CHLORIDE 30/500 ML
340 PLASTIC BAG, INJECTION (ML) INTRAVENOUS CONTINUOUS PRN
Status: DISCONTINUED | OUTPATIENT
Start: 2023-04-24 | End: 2023-04-29 | Stop reason: HOSPADM

## 2023-04-24 RX ORDER — BISACODYL 10 MG
10 SUPPOSITORY, RECTAL RECTAL DAILY PRN
Status: DISCONTINUED | OUTPATIENT
Start: 2023-04-26 | End: 2023-04-29 | Stop reason: HOSPADM

## 2023-04-24 RX ORDER — ONDANSETRON 2 MG/ML
4 INJECTION INTRAMUSCULAR; INTRAVENOUS EVERY 6 HOURS PRN
Status: DISCONTINUED | OUTPATIENT
Start: 2023-04-24 | End: 2023-04-24 | Stop reason: HOSPADM

## 2023-04-24 RX ORDER — OXYTOCIN 10 [USP'U]/ML
10 INJECTION, SOLUTION INTRAMUSCULAR; INTRAVENOUS
Status: DISCONTINUED | OUTPATIENT
Start: 2023-04-24 | End: 2023-04-24 | Stop reason: HOSPADM

## 2023-04-24 RX ORDER — CEFAZOLIN SODIUM/WATER 2 G/20 ML
2 SYRINGE (ML) INTRAVENOUS SEE ADMIN INSTRUCTIONS
Status: DISCONTINUED | OUTPATIENT
Start: 2023-04-24 | End: 2023-04-24 | Stop reason: HOSPADM

## 2023-04-24 RX ORDER — MISOPROSTOL 200 UG/1
400 TABLET ORAL
Status: DISCONTINUED | OUTPATIENT
Start: 2023-04-24 | End: 2023-04-29 | Stop reason: HOSPADM

## 2023-04-24 RX ORDER — DEXTROSE, SODIUM CHLORIDE, SODIUM LACTATE, POTASSIUM CHLORIDE, AND CALCIUM CHLORIDE 5; .6; .31; .03; .02 G/100ML; G/100ML; G/100ML; G/100ML; G/100ML
INJECTION, SOLUTION INTRAVENOUS CONTINUOUS
Status: DISCONTINUED | OUTPATIENT
Start: 2023-04-24 | End: 2023-04-29 | Stop reason: HOSPADM

## 2023-04-24 RX ORDER — HYDROCORTISONE 25 MG/G
CREAM TOPICAL 3 TIMES DAILY PRN
Status: DISCONTINUED | OUTPATIENT
Start: 2023-04-24 | End: 2023-04-29 | Stop reason: HOSPADM

## 2023-04-24 RX ORDER — ENOXAPARIN SODIUM 100 MG/ML
40 INJECTION SUBCUTANEOUS EVERY 24 HOURS
Status: DISCONTINUED | OUTPATIENT
Start: 2023-04-25 | End: 2023-04-29 | Stop reason: HOSPADM

## 2023-04-24 RX ORDER — ONDANSETRON 2 MG/ML
INJECTION INTRAMUSCULAR; INTRAVENOUS PRN
Status: DISCONTINUED | OUTPATIENT
Start: 2023-04-24 | End: 2023-04-24

## 2023-04-24 RX ORDER — AMOXICILLIN 250 MG
2 CAPSULE ORAL 2 TIMES DAILY
Status: DISCONTINUED | OUTPATIENT
Start: 2023-04-24 | End: 2023-04-29 | Stop reason: HOSPADM

## 2023-04-24 RX ADMIN — BUPIVACAINE HYDROCHLORIDE IN DEXTROSE 1.6 ML: 7.5 INJECTION, SOLUTION SUBARACHNOID at 16:28

## 2023-04-24 RX ADMIN — SODIUM CITRATE AND CITRIC ACID MONOHYDRATE 30 ML: 500; 334 SOLUTION ORAL at 15:57

## 2023-04-24 RX ADMIN — SODIUM CHLORIDE, SODIUM LACTATE, POTASSIUM CHLORIDE, CALCIUM CHLORIDE: 600; 310; 30; 20 INJECTION, SOLUTION INTRAVENOUS at 16:21

## 2023-04-24 RX ADMIN — PHENYLEPHRINE HYDROCHLORIDE 100 MCG: 10 INJECTION INTRAVENOUS at 17:04

## 2023-04-24 RX ADMIN — OXYTOCIN-SODIUM CHLORIDE 0.9% IV SOLN 30 UNIT/500ML 100 ML: 30-0.9/5 SOLUTION at 16:59

## 2023-04-24 RX ADMIN — PHENYLEPHRINE HYDROCHLORIDE 100 MCG: 10 INJECTION INTRAVENOUS at 16:39

## 2023-04-24 RX ADMIN — ONDANSETRON 4 MG: 2 INJECTION INTRAMUSCULAR; INTRAVENOUS at 16:40

## 2023-04-24 RX ADMIN — PROMETHAZINE HYDROCHLORIDE 25 MG: 25 TABLET ORAL at 23:11

## 2023-04-24 RX ADMIN — SODIUM CHLORIDE, POTASSIUM CHLORIDE, SODIUM LACTATE AND CALCIUM CHLORIDE: 600; 310; 30; 20 INJECTION, SOLUTION INTRAVENOUS at 15:29

## 2023-04-24 RX ADMIN — SODIUM CHLORIDE, SODIUM LACTATE, POTASSIUM CHLORIDE, CALCIUM CHLORIDE AND DEXTROSE MONOHYDRATE: 5; 600; 310; 30; 20 INJECTION, SOLUTION INTRAVENOUS at 21:48

## 2023-04-24 RX ADMIN — PHENYLEPHRINE HYDROCHLORIDE 100 MCG: 10 INJECTION INTRAVENOUS at 16:35

## 2023-04-24 RX ADMIN — SODIUM CHLORIDE, SODIUM LACTATE, POTASSIUM CHLORIDE, CALCIUM CHLORIDE: 600; 310; 30; 20 INJECTION, SOLUTION INTRAVENOUS at 17:12

## 2023-04-24 RX ADMIN — DEXAMETHASONE SODIUM PHOSPHATE 4 MG: 4 INJECTION, SOLUTION INTRA-ARTICULAR; INTRALESIONAL; INTRAMUSCULAR; INTRAVENOUS; SOFT TISSUE at 16:40

## 2023-04-24 RX ADMIN — MORPHINE SULFATE 0.2 MG: 1 INJECTION EPIDURAL; INTRATHECAL; INTRAVENOUS at 16:28

## 2023-04-24 RX ADMIN — PHENYLEPHRINE HYDROCHLORIDE 0.4 MCG/KG/MIN: 10 INJECTION INTRAVENOUS at 16:36

## 2023-04-24 RX ADMIN — PHENYLEPHRINE HYDROCHLORIDE 100 MCG: 10 INJECTION INTRAVENOUS at 16:43

## 2023-04-24 RX ADMIN — PHENYLEPHRINE HYDROCHLORIDE 100 MCG: 10 INJECTION INTRAVENOUS at 16:29

## 2023-04-24 RX ADMIN — CEFAZOLIN 2 G: 1 INJECTION, POWDER, FOR SOLUTION INTRAMUSCULAR; INTRAVENOUS at 16:28

## 2023-04-24 RX ADMIN — ACETAMINOPHEN 975 MG: 325 TABLET ORAL at 15:41

## 2023-04-24 RX ADMIN — PHENYLEPHRINE HYDROCHLORIDE 200 MCG: 10 INJECTION INTRAVENOUS at 16:46

## 2023-04-24 RX ADMIN — ACETAMINOPHEN 975 MG: 325 TABLET ORAL at 21:47

## 2023-04-24 RX ADMIN — SENNOSIDES AND DOCUSATE SODIUM 1 TABLET: 50; 8.6 TABLET ORAL at 21:46

## 2023-04-24 RX ADMIN — METOCLOPRAMIDE HYDROCHLORIDE 10 MG: 5 INJECTION INTRAMUSCULAR; INTRAVENOUS at 20:09

## 2023-04-24 RX ADMIN — KETOROLAC TROMETHAMINE 30 MG: 30 INJECTION, SOLUTION INTRAMUSCULAR at 17:17

## 2023-04-24 ASSESSMENT — ACTIVITIES OF DAILY LIVING (ADL)
CONCENTRATING,_REMEMBERING_OR_MAKING_DECISIONS_DIFFICULTY: NO
TOILETING_ISSUES: NO
FALL_HISTORY_WITHIN_LAST_SIX_MONTHS: NO
ADLS_ACUITY_SCORE: 18
ADLS_ACUITY_SCORE: 18
DRESSING/BATHING_DIFFICULTY: NO
WEAR_GLASSES_OR_BLIND: NO
ADLS_ACUITY_SCORE: 18
CHANGE_IN_FUNCTIONAL_STATUS_SINCE_ONSET_OF_CURRENT_ILLNESS/INJURY: NO
DOING_ERRANDS_INDEPENDENTLY_DIFFICULTY: NO
DIFFICULTY_EATING/SWALLOWING: NO
ADLS_ACUITY_SCORE: 31
WALKING_OR_CLIMBING_STAIRS_DIFFICULTY: NO
ADLS_ACUITY_SCORE: 18

## 2023-04-24 NOTE — H&P
Labor & Delivery History & Physical  Patient Name:  Titus Arnold   MRN:  [id number]  Age:  27 year old    YOB: 1995      Subjective:    Titus Arnold is a 27 year old  female with JENNY: Not found.,  Gestational Age: Unknown who presents for RCS for newly Dx'ed preE without SF.  Patient was seen in office today and found to have mild range BPs; labs over the weekend showed elevated PCR.       Denies headache, vision change, CP, SOB, n/v, upper abdominal pain, or increased swelling.     Patient denies leaking of fluid or vaginal bleeding.  Fetal Movement: present.       Prenatal care complicated by:  - PreE without SF  - GDMA2 v DM2: On , EFW 22% and AC 18%  - Marginal cord insertion  - h/o CS x1 (twins): desires RCS  - Beta Thal trait  - Obesity  - O pos  - GBS neg      Pregnancy Episode Problems (from 22 to present)     No problems associated with this episode.        OB History    Para Term  AB Living   4 3 2 1 0 4   SAB IAB Ectopic Multiple Live Births   0 0 0 1 4      # Outcome Date GA Lbr Krish/2nd Weight Sex Delivery Anes PTL Lv   4 Current            3 Term 21 37w1d 03:36 / 00:01 3.23 kg (7 lb 1.9 oz) F  IV N SHANE      Complications: Preeclampsia/Hypertension, Pre-eclampsia, antepartum, Previous  delivery, antepartum condition or complication, Anemia during pregnancy in third trimester      Name: HALEY,FEMALE-TITUS      Apgar1: 9  Apgar5: 9   2A  08/26/15 31w5d 00:20 / 00:03 1.66 kg (3 lb 10.6 oz) F Vag-Spont   SHANE      Complications:  premature rupture of membranes (PPROM) delivered, current hospitalization      Apgar1: 6  Apgar5: 8   2B  08/26/15 31w5d 00:20 / 00:15 1.91 kg (4 lb 3.4 oz) M CS-LTranv   SHANE      Complications:  premature rupture of membranes (PPROM) delivered, current hospitalization, Placental abruption, Transverse lie of fetus      Apgar1: 2  Apgar5: 7   1 Term 10/2006 40w0d   F   N SHANE      Past Medical History:   Diagnosis Date     Anemia 2015     Beta thalassemia (H)      Clinical diagnosis of COVID-19 2020     Gestational diabetes mellitus, class A2 2015     Labial cyst 2020     Obesity      Past Surgical History:   Procedure Laterality Date      SECTION N/A 2015    Procedure:  SECTION;  Surgeon: Rocio Braga MD;  Location: [facility] OR     DILATION AND CURETTAGE SUCTION N/A 4/15/2022    Procedure: DILATION AND CURETTAGE, UTERUS, USING SUCTION;  Surgeon: Shital Gauhtier MD;  Location: [facility] OR     GYN SURGERY      c/s in  for baby B     Social History     Tobacco Use     Smoking status: Never     Smokeless tobacco: Never   Substance Use Topics     Alcohol use: No     Drug use: No      History   Drug Use No     No family history on file.  [unfilled]   Medications Prior to Admission   Medication Sig Dispense Refill Last Dose     acetaminophen (TYLENOL) 325 MG tablet Take 2 tablets (650 mg) by mouth every 6 hours as needed for mild pain or fever (greater than or equal to 38  C /100.4  F (oral) or 38.5  C/ 101.4  F (core).) 30 tablet 0      ibuprofen (ADVIL/MOTRIN) 800 MG tablet Take 1 tablet (800 mg) by mouth every 6 hours as needed for other (mild and/or inflammatory pain) 30 tablet 0      ibuprofen (ADVIL/MOTRIN) 800 MG tablet Take 1 tablet (800 mg) by mouth every 6 hours as needed for moderate pain or other (cramping) 30 tablet 0      Prenatal Vit-Fe Fumarate-FA (PRENATAL MULTIVITAMIN  PLUS IRON) 27-0.8 MG TABS Take 1 tablet by mouth daily          There is no immunization history on file for this patient.    Review of Systems - NEGATIVE FOR  Fevers  Chills  Headache  Visual changes  Ear pain  Sore throat  Cough  Shortness of breath  Chest pain  Palpitations  Constipation  Diarrhea  Vomiting  Bloody stools  Hematuria  Dysuria  Muscle weakness  Gait disturbance    Objective:     0 lbs 0 oz      General: General appearance: alert, well  appearing, and in no distress.   Lungs:   unlabored   Heart:  regular rate and rhythm   Abdomen: Gravid, nontender between contractions   Bruin: Contraction Frequency (min): irritable   FHT: Baseline 120 BPM   Fetal heart variability:moderate  Fetal heart rate accelerations:  Present 15 x 15  Fetal heart rate decelerations: none  Fetal heart rate interpretation:  Category I   Speculum: N/A   Cervix: NA     Extremities: Trace to 1+ edema bilateral, symmetric edema; neg Dion's sign      Lab Review:    ABO/RH(D)   Date Value Ref Range Status   04/15/2022 O POS  Final     Hemoglobin   Date Value Ref Range Status   04/15/2022 9.7 (L) 11.7 - 15.7 g/dL Final   2021 9.2 (L) 11.7 - 15.7 g/dL Final   2021 9.5 (L) 11.7 - 15.7 g/dL Final   2021 9.4 (L) 11.7 - 15.7 g/dL Final     Hematocrit   Date Value Ref Range Status   2021 29.0 (L) 35.0 - 47.0 % Final   2021 30.2 (L) 35.0 - 47.0 % Final   2015 27.8 (L) 35.0 - 47.0 % Final         Assessment  Titus Arnold is a 27 year old  female at 39+3 wga, who presents for RCS for newly diagnosed preE without SF      Plan  - Pre-E without SF:    - Has h/o preE with previous pregnancy   - Dx based on mild range Bps and PCR of 0.94 (23)   - Repeat HELLP labs today   - Monitor Bps closely.  If sustained severe range, would treat with IV antihypertensives and start mag.  No indication for these interventions at this time.    - Proceed with delivery via desired RCS  - GDMA2 v DM2:    - On , EFW 22% and AC 18%   - Monitor FSGs per protocol   - GTT postpartum  - Marginal cord insertion  - h/o CS x1 (twins): desires RCS  - Beta Thal trait  - Obesity  - O pos  - GBS neg  - FHT: cat I.  Cont CEFM.  - PP Contraception: considering Mirena IUD  - Dispo: Proceed with RCS today at 4:30pm, as she ate at 0830

## 2023-04-24 NOTE — ANESTHESIA PROCEDURE NOTES
"Intrathecal injection Procedure Note    Pre-Procedure   Staff -        Anesthesiologist:  Jhonatan Garces MD       Performed By: anesthesiologist       Referred By: Mike       Location: OR       Pre-Anesthestic Checklist: patient identified, IV checked, risks and benefits discussed, informed consent, monitors and equipment checked, pre-op evaluation, at physician/surgeon's request and post-op pain management  Timeout:       Correct Patient: Yes        Correct Procedure: Yes        Correct Site: Yes        Correct Position: Yes   Procedure Documentation  Procedure: intrathecal injection       Patient Position: sitting       Skin prep: Betadine       Insertion Site: L3-4. (midline approach).       Needle Gauge: 25.        Needle Length (Inches): 3        Spinal Needle Type: Pencan       Introducer used       Introducer: 20 G       # of attempts: 1 and  # of redirects:  0    Assessment/Narrative         Paresthesias: No.       LP fluid removal amount (ml): 0.3       CSF fluid: clear.      FOR [facility] ([facility]) ONLY:   Pain Team Contact information: please page the Pain Team Via [employer]. Search \"Pain\". During daytime hours, please page the attending first. At night please page the resident first.      "

## 2023-04-24 NOTE — OP NOTE
Surgery Date:  2023  Surgeon:  Joy Mckeon MD,  Assistants:  none    Pre-op Diagnosis:  1. Intrauterine pregnancy at 39w3d     2. Pre-Eclampsia without SF     3. H/o CS x1, desires RCS   Post-op Diagnosis:  1. Same      2. Viable male infant   Procedure:  Repeat (unscheduled)  section with 2 layer uterine closure via Pfannenstiel skin incision and low transverse uterine incision    Anesthesia: Spinal   EBL:  418 mL  IVF:  Per anesthesia  UOP:  Clear urine at the end of the case  Drains: Jj Catheter   Specimens:  Cord blood, placenta  Complications: None   Indications:   Titus Arnold is a 27 year old  at 39w3d admitted for pre-e without SF, diagnosed on day of surgery.  The risks, benefits, and alternatives of  section were discussed with the patient, and she agreed to proceed.  Risks include, but not limited to, pain, bleeding, infection, injury to bowel/bladder/ureters, and need for further surgery.    Findings:   1. Liveborn male infant in cephalic presentation. Apgars 8 at 1 minute & 9 at 5 minutes. Weight 6# 14oz.  2. Clear amniotic fluid  3. Normal uterus, fallopian tubes, and ovaries. Left sided paratubal cyst (approx 1.5cm)  4. Dense anterior abdominal wall and peritoneal adhesions.  Thin lower uterine segment, but no window or rupture.      OPERATIVE PROCEDURE:  The patient was taken to the operating room where spinal anesthesia was placed.  Compression devices were placed on the legs, a Jj catheter was placed, and a dose of antibiotics was given preoperatively.  The patient was prepped and draped in the usual sterile fashion, and a time out was performed.  Anesthesia was found to be adequate.    A Pfannenstiel skin incision was made with the scalpel and carried through to the underlying layer of fascia.  The fascia was incised in the midline and the incision was extended laterally with the Nicole scissors.  The superior aspect of the fascial incision was grasped with  the Kocher clamps, elevated and the underlying rectus muscles were dissected off sharply with the Nicole scissors.  Attention was then turned to the inferior aspect of the incision which, in a similar fashion, was grasped, tented up with the Kocher clamps, and the rectus muscles dissected off sharply.  The rectus muscles were then  in the midline, and the peritoneum was entered bluntly.  The peritoneal incision was extended superiorly and inferiorly with good visualization of the bladder.  Dense adhesions were taken down with Bovie, scissors, and blunt dissection.  The Kishan retractor was then inserted and the vesicouterine peritoneum was identified, grasped with the pick-ups, and entered sharply with the Metzenbaum scissors; thus making the bladder flap.  The incision was then extended laterally and the bladder flap was created digitally.  The lower uterine segment was noted to be thin, but no window or rupture.  The TAMMIE was incised in a transverse fashion with the scalpel.  The uterine incision was then extended cephalad and caudad with blunt dissection.  Amniotomy was performed with an Allis.  Clear fluid was noted.  The infant's head was flexed, brought to the incision and with fundal pressure the head was delivered without difficulty.  No nuchal cord was noted.  The shoulders and body were delivered without difficulty.  A vigorous infant was noted.  The infant's nose and mouth were bulb suctioned and the cord was clamped and cut after a 45 second delay.  The infant was handed off to the waiting nurse.      The placenta was then removed manually and the uterus was cleared of all clots and debris.  The uterine incision was closed with 0-Vicryl in a running, locking fashion.  A second imbricating layer was placed using 0-monocryl using a running, non-locking stich.  Hemostasis was obtained.  The tubes and ovaries were inspected and noted to be normal.  The gutters were cleared.  The Kishan retractor was  removed.  The uterine incision was again evaluated and noted to be hemostatic.  The subfascial areas were inspected, small bleeders were cauterized and hemostasis was confirmed.  The fascia was closed with 0-Vicryl in a running fashion.  The subcutaneous tissue was then inspected and small bleeders were cauterized with the bovie.  Hemostasis was confirmed.  The subcutaneous tissue was reapproximated with 3-0 plaingut using interrupted stitches.  The skin was then closed with 4-0 monocryl in a running subcuticular fasion.      The patient tolerated the procedure well.  Sponge, lap and needle counts were correct times two.  The patient and the baby were taken to the recovery room in stable condition.

## 2023-04-24 NOTE — ANESTHESIA PREPROCEDURE EVALUATION
Anesthesia Pre-Procedure Evaluation    Patient: Titus Arnold   MRN: [id number] : 1995        Procedure : Procedure(s):   section          Past Medical History:   Diagnosis Date     Anemia      Beta thalassemia (H)      Clinical diagnosis of COVID-19 2020     Gestational diabetes mellitus, class A2 2015     Labial cyst 2020     Obesity       Past Surgical History:   Procedure Laterality Date      SECTION N/A 2015    Procedure:  SECTION;  Surgeon: Rocio Braga MD;  Location: [facility] OR     DILATION AND CURETTAGE SUCTION N/A 4/15/2022    Procedure: DILATION AND CURETTAGE, UTERUS, USING SUCTION;  Surgeon: Shital Gauthier MD;  Location: [facility] OR     GYN SURGERY      c/s in  for baby B      No Known Allergies   Social History     Tobacco Use     Smoking status: Never     Smokeless tobacco: Never   Vaping Use     Vaping status: Not on file   Substance Use Topics     Alcohol use: No      Wt Readings from Last 1 Encounters:   23 104.8 kg (231 lb)        Anesthesia Evaluation   Pt has had prior anesthetic. Type: Regional.    No history of anesthetic complications       ROS/MED HX  ENT/Pulmonary:  - neg pulmonary ROS     Neurologic:  - neg neurologic ROS     Cardiovascular:     (+) hypertension-----    METS/Exercise Tolerance: >4 METS    Hematologic: Comments: B Thal Trait    (+) anemia,     Musculoskeletal:       GI/Hepatic:     (+) GERD,     Renal/Genitourinary:  - neg Renal ROS     Endo: Comment: GDM    (+) Obesity,     Psychiatric/Substance Use:  - neg psychiatric ROS     Infectious Disease:       Malignancy:       Other:            Physical Exam    Airway        Mallampati: II   TM distance: > 3 FB   Neck ROM: full   Mouth opening: > 3 cm    Respiratory Devices and Support         Dental           Cardiovascular   cardiovascular exam normal          Pulmonary   pulmonary exam normal            Other findings: Lab Test        04/15/22      01/20/21     01/19/21     01/19/21     08/27/15     08/27/15     08/26/15                       1150          0629          1234          1012          0715          0140          1730          WBC           --          10.7          --          6.9           --          13.8*        18.6*         HGB          9.7*         9.2*         9.5*         9.4*           < >        9.5*         7.7*          MCV           --          76*           --          77*           --          74*          73*           PLT           --          220           --          225           --          158          172           INR           --           --           --           --           --           --          1.12           < > = values in this interval not displayed.                  Lab Test        04/15/22     01/20/21     01/19/21     08/21/15                       1353          0629          1012          1905          NA            --           --           --          136           POTASSIUM     --           --           --          3.9           CHLORIDE      --           --           --          103           CO2           --           --           --          23            BUN           --           --           --          4*            CR            --          0.46*        0.43*        0.48*         ANIONGAP      --           --           --          10            SHAJI           --           --           --          8.8           GLC          96            --           --          78              OUTSIDE LABS:  CBC:   Lab Results   Component Value Date    WBC 10.7 01/20/2021    WBC 6.9 01/19/2021    HGB 9.7 (L) 04/15/2022    HGB 9.2 (L) 01/20/2021    HCT 29.0 (L) 01/20/2021    HCT 30.2 (L) 01/19/2021     01/20/2021     01/19/2021     BMP:   Lab Results   Component Value Date     08/21/2015    POTASSIUM 3.9 08/21/2015    CHLORIDE 103 08/21/2015    CO2 23 08/21/2015    BUN 4 (L) 08/21/2015    CR 0.46  (L) 01/20/2021    CR 0.43 (L) 01/19/2021    GLC 96 04/15/2022    GLC 78 08/21/2015     COAGS:   Lab Results   Component Value Date    PTT 33 08/26/2015    INR 1.12 08/26/2015    FIBR 375 08/26/2015     POC:   Lab Results   Component Value Date     (H) 08/26/2015    HCG Negative 02/04/2015     HEPATIC:   Lab Results   Component Value Date    ALT 22 01/20/2021    AST 32 01/20/2021     OTHER:   Lab Results   Component Value Date    SHAJI 8.8 08/21/2015       Anesthesia Plan    ASA Status:  3      Anesthesia Type: Spinal.              Consents    Anesthesia Plan(s) and associated risks, benefits, and realistic alternatives discussed. Questions answered and patient/representative(s) expressed understanding.    - Discussed:     - Discussed with:  Patient      - Extended Intubation/Ventilatory Support Discussed: No.      - Patient is DNR/DNI Status: No    Use of blood products discussed: No .     Postoperative Care    Pain management: IV analgesics, Oral pain medications, intrathecal morphine.   PONV prophylaxis: Ondansetron (or other 5HT-3), Background Propofol Infusion     Comments:                Jhonatan Garces MD

## 2023-04-24 NOTE — PLAN OF CARE
Data: [facility] presented to [facility]: 2023  2:18 PM.  Patient admitted for repeat  for pre-eclampsia and gestational diabetes. Patient is a .  Prenatal record reviewed. Pregnancy  has been complicated by  gestational diabetes, insulin controlled, preeclampsia and obesity.  Gestational Age 39w3d. VSS. Fetal movement  active. Patient denies uterine contractions, leaking of vaginal fluid/rupture of membranes, vaginal bleeding, abdominal pain, pelvic pressure, nausea, vomiting, headache, visual disturbances, epigastric or URQ pain, significant edema. Support person is present.   Action: Verbal consent for EFM.  Admission assessment completed. Bill of rights reviewed.  Response: Patient verbalized agreement with plan. Will contact Dr Joy Mckeon with update and further orders.

## 2023-04-24 NOTE — ANESTHESIA CARE TRANSFER NOTE
Patient: Titus SMALL ole    Procedure: Procedure(s):   section       Diagnosis: History of  [Z98.891]  Diagnosis Additional Information: No value filed.    Anesthesia Type:   Spinal     Note:    Oropharynx: oropharynx clear of all foreign objects and spontaneously breathing  Level of Consciousness: awake  Oxygen Supplementation: room air    Independent Airway: airway patency satisfactory and stable  Dentition: dentition unchanged  Vital Signs Stable: post-procedure vital signs reviewed and stable  Report to RN Given: handoff report given  Patient transferred to: Labor and Delivery  Comments: Pt to MAC 1 with spouse and baby at bedside.  Report to RN.  No questions/concerns.  Handoff Report: Identifed the Patient, Identified the Reponsible Provider, Reviewed the pertinent medical history, Discussed the surgical course, Reviewed Intra-OP anesthesia mangement and issues during anesthesia, Set expectations for post-procedure period and Allowed opportunity for questions and acknowledgement of understanding      Vitals:  Vitals Value Taken Time   BP     Temp     Pulse     Resp     SpO2         Electronically Signed By: LETY Bang CRNA  2023  5:41 PM

## 2023-04-25 LAB
GLUCOSE BLDC GLUCOMTR-MCNC: 91 MG/DL (ref 70–99)
HGB BLD-MCNC: 8.1 G/DL (ref 11.7–15.7)
T PALLIDUM AB SER QL: NONREACTIVE

## 2023-04-25 PROCEDURE — 258N000003 HC RX IP 258 OP 636: Performed by: OBSTETRICS & GYNECOLOGY

## 2023-04-25 PROCEDURE — 250N000013 HC RX MED GY IP 250 OP 250 PS 637: Performed by: OBSTETRICS & GYNECOLOGY

## 2023-04-25 PROCEDURE — 85018 HEMOGLOBIN: CPT | Performed by: OBSTETRICS & GYNECOLOGY

## 2023-04-25 PROCEDURE — 36415 COLL VENOUS BLD VENIPUNCTURE: CPT | Performed by: OBSTETRICS & GYNECOLOGY

## 2023-04-25 PROCEDURE — 120N000001 HC R&B MED SURG/OB

## 2023-04-25 PROCEDURE — 250N000011 HC RX IP 250 OP 636: Performed by: OBSTETRICS & GYNECOLOGY

## 2023-04-25 RX ORDER — METHYLPREDNISOLONE SODIUM SUCCINATE 125 MG/2ML
125 INJECTION, POWDER, LYOPHILIZED, FOR SOLUTION INTRAMUSCULAR; INTRAVENOUS
Status: DISCONTINUED | OUTPATIENT
Start: 2023-04-25 | End: 2023-04-29 | Stop reason: HOSPADM

## 2023-04-25 RX ORDER — NIFEDIPINE 30 MG/1
30 TABLET, EXTENDED RELEASE ORAL DAILY
Status: DISCONTINUED | OUTPATIENT
Start: 2023-04-25 | End: 2023-04-26

## 2023-04-25 RX ORDER — DIPHENHYDRAMINE HYDROCHLORIDE 50 MG/ML
50 INJECTION INTRAMUSCULAR; INTRAVENOUS
Status: DISCONTINUED | OUTPATIENT
Start: 2023-04-25 | End: 2023-04-29 | Stop reason: HOSPADM

## 2023-04-25 RX ORDER — OXYTOCIN/0.9 % SODIUM CHLORIDE 30/500 ML
100-340 PLASTIC BAG, INJECTION (ML) INTRAVENOUS CONTINUOUS PRN
Status: DISCONTINUED | OUTPATIENT
Start: 2023-04-25 | End: 2023-04-29 | Stop reason: HOSPADM

## 2023-04-25 RX ORDER — OXYTOCIN 10 [USP'U]/ML
10 INJECTION, SOLUTION INTRAMUSCULAR; INTRAVENOUS
Status: DISCONTINUED | OUTPATIENT
Start: 2023-04-25 | End: 2023-04-29 | Stop reason: HOSPADM

## 2023-04-25 RX ADMIN — SENNOSIDES AND DOCUSATE SODIUM 1 TABLET: 50; 8.6 TABLET ORAL at 21:08

## 2023-04-25 RX ADMIN — ACETAMINOPHEN 975 MG: 325 TABLET ORAL at 19:58

## 2023-04-25 RX ADMIN — KETOROLAC TROMETHAMINE 30 MG: 30 INJECTION, SOLUTION INTRAMUSCULAR; INTRAVENOUS at 00:43

## 2023-04-25 RX ADMIN — SENNOSIDES AND DOCUSATE SODIUM 1 TABLET: 50; 8.6 TABLET ORAL at 09:15

## 2023-04-25 RX ADMIN — KETOROLAC TROMETHAMINE 30 MG: 30 INJECTION, SOLUTION INTRAMUSCULAR; INTRAVENOUS at 13:00

## 2023-04-25 RX ADMIN — ENOXAPARIN SODIUM 40 MG: 40 INJECTION SUBCUTANEOUS at 06:05

## 2023-04-25 RX ADMIN — NIFEDIPINE 30 MG: 30 TABLET, FILM COATED, EXTENDED RELEASE ORAL at 19:57

## 2023-04-25 RX ADMIN — SODIUM CHLORIDE, POTASSIUM CHLORIDE, SODIUM LACTATE AND CALCIUM CHLORIDE 1000 ML: 600; 310; 30; 20 INJECTION, SOLUTION INTRAVENOUS at 01:29

## 2023-04-25 RX ADMIN — KETOROLAC TROMETHAMINE 30 MG: 30 INJECTION, SOLUTION INTRAMUSCULAR; INTRAVENOUS at 06:05

## 2023-04-25 RX ADMIN — ACETAMINOPHEN 975 MG: 325 TABLET ORAL at 11:52

## 2023-04-25 RX ADMIN — IRON SUCROSE 300 MG: 20 INJECTION, SOLUTION INTRAVENOUS at 10:40

## 2023-04-25 RX ADMIN — ACETAMINOPHEN 975 MG: 325 TABLET ORAL at 04:51

## 2023-04-25 RX ADMIN — SODIUM CHLORIDE, SODIUM LACTATE, POTASSIUM CHLORIDE, CALCIUM CHLORIDE AND DEXTROSE MONOHYDRATE: 5; 600; 310; 30; 20 INJECTION, SOLUTION INTRAVENOUS at 05:03

## 2023-04-25 RX ADMIN — IBUPROFEN 800 MG: 800 TABLET ORAL at 19:59

## 2023-04-25 ASSESSMENT — ACTIVITIES OF DAILY LIVING (ADL)
ADLS_ACUITY_SCORE: 22
ADLS_ACUITY_SCORE: 18
ADLS_ACUITY_SCORE: 22
ADLS_ACUITY_SCORE: 18
ADLS_ACUITY_SCORE: 22

## 2023-04-25 NOTE — PROVIDER NOTIFICATION
04/25/23 0120   Provider Notification   Provider Name/Title Dr. Mckeon   Method of Notification Phone   Request Evaluate-Remote   Notification Reason Status Update     Provider notified regarding brown urine output with sediment from munson catheter. Verbal order placed for 1000ml bolus of lactated ringers.

## 2023-04-25 NOTE — PROVIDER NOTIFICATION
Paged Dr Cordova with bp of 151/80 at approx 1120.     Next bp check with iron infusion at 1130 was 145/73

## 2023-04-25 NOTE — PLAN OF CARE
During recovery in L&D patient spiked severe pressures and was treated with IV Labetalol once. Hypertensive algorithm started and continued throughout the night. No more severe pressures occurred. Patient experienced several bouts of N/V. IV Reglan did not improve vomiting. PO Phenergan given per doctors orders and patient found relief.  Patient stood at bedside and transferred to new bed. Urine output scant and concentrated. 1 L of fluids blouses per physicians orders and continuous fluids now running at 125. Will continue to monitor output. Bonding well with infant. Attempting to breastfeed, but also supplementing with formula. Fundus firm and midline. Bleeding scant. Jj in place.

## 2023-04-25 NOTE — PROGRESS NOTES
[facility]  POD#1      Pt doing well, laying comfortable in bed. Jj in place draining clear urine. Tolerating PO. Not ambulating yet but willing to start now. Decreased lochia. Pain controlled. Breast feeding and coping well with infant. Denies having any concerns at this point. Denies any HA, SOB, CP, scotomata, RUQ or epigastric pain, increased swelling. Denies feeling dizzy but is consenting for IV iron infusion in light of her low Hgb.    Vitals:    23 0205 23 0305 23 0406 23 0749   BP: 137/76 134/68 127/70 (!) 148/76   BP Location: Left arm Left arm Left arm Left arm   Patient Position: Semi-Cevallos's Semi-Cevallos's Semi-Cevallos's    Cuff Size: Adult Large Adult Large Adult Large    Pulse:  81 89 91   Resp: 16 16 16 16   Temp:  98.1  F (36.7  C)  98.1  F (36.7  C)   TempSrc:  Oral  Oral   SpO2: 99% 99% 97% 98%   Weight:       Height:         Abd soft, NTGR, FFBU, no fundal tenderness, incision well approximated with sutures and steri strips, OR dressing dry and clean was removed  Ext no edema, no cyanosis, pulses +    Lab Results   Component Value Date    HGB 8.1 2023    HGB 9.2 2021      Latest Reference Range & Units 04/15/22 13:53 23 19:36 23 04:53   GLUCOSE BY METER POCT 70 - 99 mg/dL 96 79 91       A/P 27 year old  at 39w4d s/p POD#1 rLTCS secondary to hx of prior CD in the setting of newly diagnosed pre-E w/o SF.       -hemodynamically stable   - Acute on chronic anemia, acute blood loss anemia - asymptomatic   - IV iron infusion   - PO iron, iron rich diet, vit C  -Rh pos  -Diet regular  -Pain control with Tylenol, Motrin and Oxycodone  -DVT ppx - SCDs while on bed and ambulation  -bowel ppx- Senna/docusate, simethicone  -Breast feeding, encouraged, continue PNV's, proper hydration and caloric intake counseled.  -Tdap and flu given prenatally  -Rubella immune  -BC; not discussed    Pre-E w/o SF  - BPs q 2 hrs while awake, if BP's remain wnl  then will space out to q 4 hrs   - currently no meds  - currently no signs/symptoms of worsening pre-E  - Lovenox PP      GDMA2 VS T2  - Fasting this am WNL  - will need GTT between 6-12 wk PP      Plan; continue routine PP care    Dr. Jesse Cordova  [phone]  4/25/2023 8:53 AM

## 2023-04-25 NOTE — PROVIDER NOTIFICATION
04/24/23 1951   Provider Notification   Provider Name/Title Dr Mckeon   Method of Notification Electronic Page   Request Evaluate-Remote   Notification Reason Vital Signs Change     Spoke to Dr Mckeon regarding patients elevated blood pressures and N/V. Orders received to give reglan 10 mg once. Orders also received to get another BP 15 mins after last and if still in severe range treat with IV labetalol.

## 2023-04-25 NOTE — PROGRESS NOTES
PROGRESS NOTE    Vitals:    04/25/23 1159 04/25/23 1231 04/25/23 1300 04/25/23 1326   BP: (!) 143/80 (!) 155/73 128/73 137/75   BP Location:  Left arm     Patient Position:  Semi-Cevallos's     Cuff Size:  Adult Large     Pulse: 78 80 76 77   Resp: 16 16     Temp:  98.3  F (36.8  C)     TempSrc:  Oral     SpO2:  99% 100%    Weight:       Height:         - per RN report, BP cuff adjustment done this morning, still noting occasional mild range BP's  - pt remains asymptomatic  - most recently BP's are WNL  - consider medications if BP's remain consistently in the low mild range. Will initiate if any future high mild range.   - RN will report any BP's above 150/100s  - if BP remain wnl ok to go back to BPs q 4 hrs    Dr. Jesse Cordova  [phone]

## 2023-04-25 NOTE — DISCHARGE SUMMARY
[facility]    Discharge Summary  Obstetrics    Date of Admission:  2023  Date of Discharge:  2023  Discharging Provider: marly    Discharge Diagnoses   S/p   Pre-e without SF  GDM A1  Acute on chronic anemia due to blood loss  Wound hematoma       History of Present Illness   Titus Arnold is a 27 year old female now  who presented to L&D @ 39w4d GA with newly diagnosed pre-E without severe features in the setting of hx of prior  delivery requesting repeat CD. Her pregnancy has been complicated by  - hx of prior CD, GDMA2 VS T2, beta thal trait, class 1 obesity, chronic anemia     Please see her admit H&P for full details of her PMH, PSH, Meds, Allergies and exam on admit.    Hospital Course   Surgery Date:            2023  Surgeon:        Joy Mckeon MD,  Assistants:     none     Pre-op Diagnosis:     1. Intrauterine pregnancy at 39w3d                                      2. Pre-Eclampsia without SF                                      3. H/o CS x1, desires RCS   Post-op Diagnosis:   1. Same                                       2. Viable male infant   Procedure:     Repeat (unscheduled)  section with 2 layer uterine closure via Pfannenstiel skin incision and low transverse uterine incision     Anesthesia:    Spinal   EBL:    418 mL  IVF:     Per anesthesia  UOP:   Clear urine at the end of the case  Drains: Jj Catheter   Specimens:    Cord blood, placenta  Complications:          None   Indications:   Titus Arnold is a 27 year old  at 39w3d admitted for pre-e without SF, diagnosed on day of surgery.  The risks, benefits, and alternatives of  section were discussed with the patient, and she agreed to proceed.  Risks include, but not limited to, pain, bleeding, infection, injury to bowel/bladder/ureters, and need for further surgery.     Findings:   1. Liveborn male infant in cephalic presentation. Apgars 8 at 1 minute  & 9 at 5 minutes. Weight 6# 14oz.  2. Clear amniotic fluid  3. Normal uterus, fallopian tubes, and ovaries. Left sided paratubal cyst (approx 1.5cm)  4. Dense anterior abdominal wall and peritoneal adhesions.  Thin lower uterine segment, but no window or rupture.        OPERATIVE PROCEDURE:  The patient was taken to the operating room where spinal anesthesia was placed.  Compression devices were placed on the legs, a Jj catheter was placed, and a dose of antibiotics was given preoperatively.  The patient was prepped and draped in the usual sterile fashion, and a time out was performed.  Anesthesia was found to be adequate.     A Pfannenstiel skin incision was made with the scalpel and carried through to the underlying layer of fascia.  The fascia was incised in the midline and the incision was extended laterally with the Nicole scissors.  The superior aspect of the fascial incision was grasped with the Kocher clamps, elevated and the underlying rectus muscles were dissected off sharply with the Nicole scissors.  Attention was then turned to the inferior aspect of the incision which, in a similar fashion, was grasped, tented up with the Kocher clamps, and the rectus muscles dissected off sharply.  The rectus muscles were then  in the midline, and the peritoneum was entered bluntly.  The peritoneal incision was extended superiorly and inferiorly with good visualization of the bladder.  Dense adhesions were taken down with Bovie, scissors, and blunt dissection.  The Kishan retractor was then inserted and the vesicouterine peritoneum was identified, grasped with the pick-ups, and entered sharply with the Metzenbaum scissors; thus making the bladder flap.  The incision was then extended laterally and the bladder flap was created digitally.  The lower uterine segment was noted to be thin, but no window or rupture.  The TAMMIE was incised in a transverse fashion with the scalpel.  The uterine incision was then  extended cephalad and caudad with blunt dissection.  Amniotomy was performed with an Allis.  Clear fluid was noted.  The infant's head was flexed, brought to the incision and with fundal pressure the head was delivered without difficulty.  No nuchal cord was noted.  The shoulders and body were delivered without difficulty.  A vigorous infant was noted.  The infant's nose and mouth were bulb suctioned and the cord was clamped and cut after a 45 second delay.  The infant was handed off to the waiting nurse.       The placenta was then removed manually and the uterus was cleared of all clots and debris.  The uterine incision was closed with 0-Vicryl in a running, locking fashion.  A second imbricating layer was placed using 0-monocryl using a running, non-locking stich.  Hemostasis was obtained.  The tubes and ovaries were inspected and noted to be normal.  The gutters were cleared.  The Kishan retractor was removed.  The uterine incision was again evaluated and noted to be hemostatic.  The subfascial areas were inspected, small bleeders were cauterized and hemostasis was confirmed.  The fascia was closed with 0-Vicryl in a running fashion.  The subcutaneous tissue was then inspected and small bleeders were cauterized with the bovie.  Hemostasis was confirmed.  The subcutaneous tissue was reapproximated with 3-0 plaingut using interrupted stitches.  The skin was then closed with 4-0 monocryl in a running subcuticular fasion.       The patient tolerated the procedure well.  Sponge, lap and needle counts were correct times two.  The patient and the baby were taken to the recovery room in stable condition.     Her postpartum course was complicated by pre-e without SF and BP medication was adjusted to procardia XR 60mg in AM and 30mg in PM along with labetalol 200mg Q8h. She also had bleeding from a wound hematoma on 4/28 which had resolved (the bleeding) by discharge. On postpartum day 5, she was meeting all of her  postpartum goals and deemed stable for discharge. She was voiding without difficulty, tolerating a regular diet without nausea and vomiting, her pain was well controlled on oral pain medicines and her lochia was appropriate.    Hgb:   Lab Results   Component Value Date    HGB 8.1 2023    HGB 10.1 2023    HGB 9.2 2021    HGB 9.5 2021       Lab Results   Component Value Date    RH Pos 2021    and rhogam was not given    Contraception was discussed and will be addressed at her postpartum appointment.    Instructions:  1) Call for temperature greater than 100.4F, foul smelling vaginal discharge, bleeding more than 1 pad per hour for 2 hrs, pain not controlled by oral pain meds, severe constipation or severe nausea or vomiting.  2) She was instructed to follow-up in clinic in 1 week for BP check and wound check and then with her primary OB in 6 weeks for a routine postpartum visit  3) She was instructed to continue her PNV on discharge if she wished to breast feed her infant.    Devorah Concepcion, DO     Discharge Disposition   Discharged to home   Condition at discharge: Stable    Primary Care Physician   Physician No Ref-Primary    Consultations This Hospital Stay   LACTATION IP CONSULT    Discharge Orders   No discharge procedures on file.  Discharge Medications   Current Discharge Medication List      START taking these medications    Details   ferrous sulfate (FEROSUL) 325 (65 Fe) MG tablet Take 1 tablet (325 mg) by mouth every other day  Qty: 90 tablet, Refills: 0    Associated Diagnoses:  delivery, delivered, current hospitalization      labetalol (NORMODYNE) 200 MG tablet Take 1 tablet (200 mg) by mouth every 8 hours  Qty: 30 tablet, Refills: 1    Associated Diagnoses: Pre-eclampsia, antepartum      lanolin ointment Apply topically every hour as needed for dry skin (soreness)  Qty: 7 g, Refills: 3    Associated Diagnoses:  delivery, delivered, current  hospitalization      NIFEdipine ER OSMOTIC (ADALAT CC) 30 MG 24 hr tablet Take 2 tabs in AM and 1 tab in the PM  Qty: 60 tablet, Refills: 1    Associated Diagnoses: Pre-eclampsia, antepartum      senna-docusate (SENOKOT-S/PERICOLACE) 8.6-50 MG tablet Take 1 tablet by mouth 2 times daily as needed for constipation  Qty: 60 tablet, Refills: 1    Associated Diagnoses:  delivery, delivered, current hospitalization         CONTINUE these medications which have CHANGED    Details   ibuprofen (ADVIL/MOTRIN) 800 MG tablet Take 1 tablet (800 mg) by mouth every 6 hours as needed for moderate pain  Qty: 60 tablet, Refills: 1    Associated Diagnoses: S/P          CONTINUE these medications which have NOT CHANGED    Details   acetaminophen (TYLENOL) 325 MG tablet Take 2 tablets (650 mg) by mouth every 6 hours as needed for mild pain or fever (greater than or equal to 38  C /100.4  F (oral) or 38.5  C/ 101.4  F (core).)  Qty: 30 tablet, Refills: 0    Associated Diagnoses:  (vaginal birth after )      Prenatal Vit-Fe Fumarate-FA (PRENATAL MULTIVITAMIN  PLUS IRON) 27-0.8 MG TABS Take 1 tablet by mouth daily           Allergies   No Known Allergies

## 2023-04-25 NOTE — PROVIDER NOTIFICATION
Paged Dr Cordova  Hgb 8.1 and bp 148/76.  Orders received for bp checks q2hs while awake. Call MD if greater than 150/100.

## 2023-04-25 NOTE — PLAN OF CARE
Vs: bp elevated 130-150's/70-90's. Other vss. Continue monitor bp q2hrs. Notify md if greater than 150/100.  GI:tolerating regular diet  :munson with adequate urine output. Munson removed at 1445.  Skin: inc c/d with steri strips  Activity:up to br with assist of 1.   Pain: pain controlled with tylenol/toradol  Plan: continue to check bp q2hrs. Encourage breastfeeding as pt is able, pain control.

## 2023-04-25 NOTE — PROVIDER NOTIFICATION
04/24/23 2246   Provider Notification   Provider Name/Title Dr Mckeon   Method of Notification Electronic Page   Request Evaluate-Remote   Notification Reason Medication Request;Other     Spoke with Dr Mckeon in regards to no orders for GDM 2. Orders received to check pt blood sugar fasting in AM. No insulin ordered. Also, orders given to give patient Phenergan 25 mg Q4 PRN.    no

## 2023-04-25 NOTE — PROVIDER NOTIFICATION
04/25/23 1816   Provider Notification   Provider Name/Title Dr Cordova   Method of Notification Phone   Request Evaluate-Remote   Notification Reason Status Update   Dr Cordova updated with the pt's BPs throughout the day. Dr Cordova would like to start the patient on 30 mg of Nifedipine XL daily. Pt to receive one dose now and to start scheduled doses tomorrow morning. RN to put in telephone order with readback.

## 2023-04-25 NOTE — ANESTHESIA POSTPROCEDURE EVALUATION
Patient: Titus Arnold    Procedure: Procedure(s):   section       Anesthesia Type:  Spinal    Note:  Disposition: Inpatient   Postop Pain Control: Uneventful            Sign Out: Well controlled pain   PONV: No   Neuro/Psych: Uneventful            Sign Out: Acceptable/Baseline neuro status   Airway/Respiratory: Uneventful            Sign Out: Acceptable/Baseline resp. status   CV/Hemodynamics: Uneventful            Sign Out: Acceptable CV status; No obvious hypovolemia; No obvious fluid overload   Other NRE: NONE   DID A NON-ROUTINE EVENT OCCUR? No           Last vitals:  Vitals Value Taken Time   /80 23 1842   Temp     Pulse     Resp 18 23 1827   SpO2 100 % 23 1843   Vitals shown include unvalidated device data.    Electronically Signed By: Stevie Wynn MD  2023  7:38 PM

## 2023-04-25 NOTE — LACTATION NOTE
This note was copied from a baby's chart.   visit. This is Titus's fifth baby, she reports breastfeeding with her previous children. She states this baby has been very spitty, they've been attempting at breast and using some formula post feeding. Writer assisted with latch attempt on L breast in cradle hold - finger exercises done prior to latch and infant gagging on gloved finger. Burped with small spit up - moved to breast. Titus with good positioning, encouraged her to tickle area between nose and top lip to encourage wide mouth - infant able to latch briefly, noted to gag and spit at breast. Pulled upright on Ohyeme - encouraged her to keep him upright for 15-20 minutes then call for feeding assistance. Titus preferring to bottle feed at this time - writer assisted her with bottle preparation and discussed small volumes to offer. Plan for additional follow up as needed - bedside RN updated on visit.

## 2023-04-26 PROCEDURE — 250N000013 HC RX MED GY IP 250 OP 250 PS 637: Performed by: OBSTETRICS & GYNECOLOGY

## 2023-04-26 PROCEDURE — 120N000001 HC R&B MED SURG/OB

## 2023-04-26 PROCEDURE — 250N000011 HC RX IP 250 OP 636: Performed by: OBSTETRICS & GYNECOLOGY

## 2023-04-26 RX ORDER — NIFEDIPINE 30 MG/1
60 TABLET, EXTENDED RELEASE ORAL DAILY
Status: DISCONTINUED | OUTPATIENT
Start: 2023-04-27 | End: 2023-04-29 | Stop reason: HOSPADM

## 2023-04-26 RX ORDER — NIFEDIPINE 30 MG/1
30 TABLET, EXTENDED RELEASE ORAL ONCE
Status: COMPLETED | OUTPATIENT
Start: 2023-04-26 | End: 2023-04-26

## 2023-04-26 RX ADMIN — Medication 1 TABLET: at 08:16

## 2023-04-26 RX ADMIN — IBUPROFEN 800 MG: 800 TABLET ORAL at 08:16

## 2023-04-26 RX ADMIN — IBUPROFEN 800 MG: 800 TABLET ORAL at 20:02

## 2023-04-26 RX ADMIN — ACETAMINOPHEN 975 MG: 325 TABLET ORAL at 20:02

## 2023-04-26 RX ADMIN — NIFEDIPINE 30 MG: 30 TABLET, FILM COATED, EXTENDED RELEASE ORAL at 08:16

## 2023-04-26 RX ADMIN — IBUPROFEN 800 MG: 800 TABLET ORAL at 14:01

## 2023-04-26 RX ADMIN — ENOXAPARIN SODIUM 40 MG: 40 INJECTION SUBCUTANEOUS at 06:00

## 2023-04-26 RX ADMIN — SENNOSIDES AND DOCUSATE SODIUM 1 TABLET: 50; 8.6 TABLET ORAL at 23:03

## 2023-04-26 RX ADMIN — SENNOSIDES AND DOCUSATE SODIUM 2 TABLET: 50; 8.6 TABLET ORAL at 08:17

## 2023-04-26 RX ADMIN — ACETAMINOPHEN 975 MG: 325 TABLET ORAL at 08:16

## 2023-04-26 RX ADMIN — ACETAMINOPHEN 975 MG: 325 TABLET ORAL at 14:01

## 2023-04-26 RX ADMIN — NIFEDIPINE 30 MG: 30 TABLET, FILM COATED, EXTENDED RELEASE ORAL at 23:30

## 2023-04-26 RX ADMIN — IBUPROFEN 800 MG: 800 TABLET ORAL at 02:14

## 2023-04-26 RX ADMIN — ACETAMINOPHEN 975 MG: 325 TABLET ORAL at 02:12

## 2023-04-26 RX ADMIN — NIFEDIPINE 30 MG: 30 TABLET, FILM COATED, EXTENDED RELEASE ORAL at 14:01

## 2023-04-26 ASSESSMENT — ACTIVITIES OF DAILY LIVING (ADL)
ADLS_ACUITY_SCORE: 18

## 2023-04-26 NOTE — PLAN OF CARE
Vital signs stable with exception of elevated blood pressures - see flow sheet. Denies headache, vision changes, epigastric pain, nausea. Nifedipine dose was increased to 60 mg today. Postpartum checks within defined limits. Voiding spontaneously, passing flatus. Ambulating independently, denies dizziness. Incision is approximated with steri strips, scant dried drainage unchanged throughout day. Reports pain is well controlled with tylenol and ibuprofen. Breastfeeding every 2-3 hours, latch observed. Supplementing with formula per maternal request. Attentive to  cues, bonding well.

## 2023-04-26 NOTE — PLAN OF CARE
Data: Vital signs within normal limits. Postpartum checks within normal limits - see flow record. Patient eating and drinking normally. Patient able to empty bladder independently and is up ambulating. No apparent signs of infection. Patient performing self cares, is able to care for infant and is breast and formula feeding every 2-3 hours.  Incision appears within normal. Is using Tylenol and Ibuprofen for mild discomfort.    Action: Patient medicated during the shift for cramping. See MAR. Patient education done, see flow record.  Response: Positive attachment behaviors observed with infant. Patient's spouse present this shift.   Plan: Continue current plan of care.  Anticipate discharge on 4/27/23.

## 2023-04-26 NOTE — PROGRESS NOTES
Patient Name:  Titus Arnold   MRN:  [id number]  Age:  27 year old    YOB: 1995      POSTPARTUM/POST-OPERATIVE PROGRESS NOTE  Pt is POD#2 s/p C/S.  She is doing well without complaints. Denies headaches, nausea or vomiting.   Pt is ambulating and tolerating a regular diet.  Jj is out. and she is voiding without complication.  Pain is well controlled with PO medication and lochia is within normal limits.  She is breastfeeding.  Baby is doing well.      Objective:    Temp:  [98.2  F (36.8  C)-98.5  F (36.9  C)] 98.5  F (36.9  C)  Pulse:  [75-89] 82  Resp:  [16] 16  BP: (137-155)/(75-94) 147/94  233 lbs 6.4 oz   Vitals:    04/26/23 0500 04/26/23 0813 04/26/23 0956 04/26/23 1205   BP: (!) 147/76 (!) 155/78 (!) 153/84 (!) 147/94   BP Location: Left arm Left arm Left arm Left arm   Patient Position: Sitting Sitting Sitting Sitting   Cuff Size: Adult Large Adult Large Adult Large Adult Large   Pulse: 75 82 78 82   Resp:  16     Temp:  98.5  F (36.9  C)     TempSrc:  Oral     SpO2:       Weight:       Height:               General Appearance:  NAD, A&O x 3, comfortable  Lungs:  unlabored  Abdomen:  soft, minimally distended; appropriately tender near incision; no rebound or guarding  Fundus:  firm, below the umbilicus  Incision:  clean, dry and intact  Lower extremities: 2+ edema, no cords      Lab Review:    ABO/RH(D)   Date Value Ref Range Status   04/24/2023 O POS  Final     Hemoglobin   Date Value Ref Range Status   04/25/2023 8.1 (L) 11.7 - 15.7 g/dL Final   04/24/2023 10.1 (L) 11.7 - 15.7 g/dL Final   04/15/2022 9.7 (L) 11.7 - 15.7 g/dL Final   01/20/2021 9.2 (L) 11.7 - 15.7 g/dL Final   01/19/2021 9.5 (L) 11.7 - 15.7 g/dL Final   01/19/2021 9.4 (L) 11.7 - 15.7 g/dL Final     Hematocrit   Date Value Ref Range Status   04/24/2023 32.3 (L) 35.0 - 47.0 % Final   01/20/2021 29.0 (L) 35.0 - 47.0 % Final   01/19/2021 30.2 (L) 35.0 - 47.0 % Final   08/27/2015 27.8 (L) 35.0 - 47.0 % Final     Recent Labs    Lab 23  0453 23  1936 23  1536   GLC 91 79 69*           Assessment:  POD#2s/p  for pre-e without severe features.  Mild anemia.    Plan:  Pre-e without severe features. Started on nifedipine 30 XL last night, got second dose (scheduled daily) this am. BP now 140-150s/70-90s  --add another nifedipine 30  Mg XL now and 60 mg XL in am for total 60 mg XL daily  --watch Bps overnight to make sure controlled    S/p csection  - Post-op: recovering well. Pain well controlled. Cont PO pain meds and regular diet. Encourage ambulation.  --lovenox for DVT prevention given obesity    GDMA1  --post partum glucose ok    Anemia  --fe bid    Dispo  --anticipate DC POD#3 or #4      Patricia Welsh MD [facility]  2023

## 2023-04-26 NOTE — PROVIDER NOTIFICATION
04/26/23 1809   Provider Notification   Provider Name/Title Dr. Mccarty   Method of Notification Phone   Request Evaluate-Remote   Notification Reason Vital Signs Change     FYI /78, asymptomatic. Per Dr. Mccarty, continue to monitor Q2 while awake, Q4 while asleep.

## 2023-04-26 NOTE — PLAN OF CARE
Goal Outcome Evaluation:      Plan of Care Reviewed With: patient, spouse    Overall Patient Progress: improvingOverall Patient Progress: improving         BP elevated but not within severe range. All other vital signs stable on room air. Breastfeeding with minimal assistance from staff. Bonding well with infant. Independent with cares for self and infant. Voiding spontaneously. Tolerating a regular diet. Pain controled with tylenol and ibuprofen.  and female visitor at bedside and supportive.

## 2023-04-27 LAB
PATH REPORT.COMMENTS IMP SPEC: NORMAL
PATH REPORT.COMMENTS IMP SPEC: NORMAL
PATH REPORT.FINAL DX SPEC: NORMAL
PATH REPORT.GROSS SPEC: NORMAL
PATH REPORT.MICROSCOPIC SPEC OTHER STN: NORMAL
PATH REPORT.RELEVANT HX SPEC: NORMAL
PHOTO IMAGE: NORMAL
PLATELET # BLD AUTO: 247 10E3/UL (ref 150–450)

## 2023-04-27 PROCEDURE — 36415 COLL VENOUS BLD VENIPUNCTURE: CPT | Performed by: OBSTETRICS & GYNECOLOGY

## 2023-04-27 PROCEDURE — 85049 AUTOMATED PLATELET COUNT: CPT | Performed by: OBSTETRICS & GYNECOLOGY

## 2023-04-27 PROCEDURE — 250N000013 HC RX MED GY IP 250 OP 250 PS 637: Performed by: OBSTETRICS & GYNECOLOGY

## 2023-04-27 PROCEDURE — 120N000001 HC R&B MED SURG/OB

## 2023-04-27 PROCEDURE — 250N000011 HC RX IP 250 OP 636: Performed by: OBSTETRICS & GYNECOLOGY

## 2023-04-27 RX ORDER — MODIFIED LANOLIN
OINTMENT (GRAM) TOPICAL
Qty: 7 G | Refills: 3 | Status: SHIPPED | OUTPATIENT
Start: 2023-04-27

## 2023-04-27 RX ORDER — AMOXICILLIN 250 MG
1 CAPSULE ORAL 2 TIMES DAILY PRN
Qty: 60 TABLET | Refills: 1 | Status: SHIPPED | OUTPATIENT
Start: 2023-04-27

## 2023-04-27 RX ORDER — NIFEDIPINE 30 MG/1
30 TABLET, EXTENDED RELEASE ORAL EVERY 24 HOURS
Status: DISCONTINUED | OUTPATIENT
Start: 2023-04-27 | End: 2023-04-29 | Stop reason: HOSPADM

## 2023-04-27 RX ORDER — FERROUS SULFATE 325(65) MG
325 TABLET ORAL EVERY OTHER DAY
Qty: 90 TABLET | Refills: 0 | Status: SHIPPED | OUTPATIENT
Start: 2023-04-27

## 2023-04-27 RX ADMIN — IBUPROFEN 800 MG: 800 TABLET ORAL at 03:04

## 2023-04-27 RX ADMIN — NIFEDIPINE 30 MG: 30 TABLET, FILM COATED, EXTENDED RELEASE ORAL at 20:40

## 2023-04-27 RX ADMIN — SENNOSIDES AND DOCUSATE SODIUM 2 TABLET: 50; 8.6 TABLET ORAL at 08:54

## 2023-04-27 RX ADMIN — ACETAMINOPHEN 975 MG: 325 TABLET ORAL at 20:40

## 2023-04-27 RX ADMIN — ACETAMINOPHEN 975 MG: 325 TABLET ORAL at 14:58

## 2023-04-27 RX ADMIN — ENOXAPARIN SODIUM 40 MG: 40 INJECTION SUBCUTANEOUS at 06:14

## 2023-04-27 RX ADMIN — IBUPROFEN 800 MG: 800 TABLET ORAL at 14:58

## 2023-04-27 RX ADMIN — ACETAMINOPHEN 975 MG: 325 TABLET ORAL at 08:53

## 2023-04-27 RX ADMIN — ACETAMINOPHEN 975 MG: 325 TABLET ORAL at 03:04

## 2023-04-27 RX ADMIN — NIFEDIPINE 60 MG: 30 TABLET, FILM COATED, EXTENDED RELEASE ORAL at 08:45

## 2023-04-27 RX ADMIN — IBUPROFEN 800 MG: 800 TABLET ORAL at 08:54

## 2023-04-27 RX ADMIN — IBUPROFEN 800 MG: 800 TABLET ORAL at 20:40

## 2023-04-27 RX ADMIN — SENNOSIDES AND DOCUSATE SODIUM 1 TABLET: 50; 8.6 TABLET ORAL at 20:40

## 2023-04-27 ASSESSMENT — ACTIVITIES OF DAILY LIVING (ADL)
ADLS_ACUITY_SCORE: 18

## 2023-04-27 NOTE — PROVIDER NOTIFICATION
04/26/23 2300   Provider Notification   Provider Name/Title Dr. Concepcion   Method of Notification Phone   Request Evaluate-Remote   Notification Reason Vital Signs Change     MD made aware of BP of 152/81. MD would like a one time dose of Nifedipine 30 mg now, check BP q2 hours when awake and q4 hours if asleep. Then reassess in the morning if the AM dose needs to be increased.

## 2023-04-27 NOTE — DISCHARGE INSTRUCTIONS
"BLOOD PRESSURE MONITORING  - please check your blood pressure twice daily (morning/evening)  - write down your blood pressures in a book so we can review them in upcoming visits  - if you have any blood pressure that: systolic (upper number) is 160 or more, and/or diastolic (lower number) is 110 or above, call us immediately for further instructions. If your blood pressure persists to be severely elevated on subsequent checks done every 15 min, then please direct yourself to the ED for further evaluation.   - pre-eclampsia signs and symptoms you should be aware of are: headache that does not resolve with Tylenol, spots in your vision, worsening/generalized swelling, right upper and mid upper abdominal pain, tender to the touch and not resolving with Tylenol/Ibuprofen. Please notify us immediately about this symptoms.   - make an appointment in 1 week so we can review your blood pressures and plan next steps in management.  ----------------  Postpartum Discharge Instructions  ACTIVITY:  - You may ride in a car, but no driving for 1-2 weeks.  - Do not lift anything heavier than your baby for 6 weeks.  - You may slowly go up and down stairs as you feel able.  - Resume other exercises after 6 weeks.  - Rest when your baby is sleeping.  - Call your doctor if you are feeling \"blue\" for more than 2 weeks.  - Call your doctor immediately or go the Emergency Center if you think you might hurt yourself or your baby.  HYGIENE:  - You may take a tub bath or shower.  - Continue using a thompson bottle or sitz bath for comfort or cleanliness.  - No douching or tampon use until after 6 week checkup.  DIET:  - Wait 6 weeks before dieting to lose weight.  - Aim for gradual weight loss through healthy eating habits.  - Take a vitamin daily unless otherwise directed.  BREASTFEEDING:  - Refer to Breastfeeding Guidelines booklet or call Breastfeeding support [place]: [phone]  MEDICATIONS:  - Use as directed on prescription.  PAIN " MANAGEMENT:    Breast Care:  - If not breastfeeding, apply ice packs to your breasts 3 times per day for 15 minutes.  Wear a tight bra for at least one week.  - If breastfeeding, nurse often to get relief, pain medication as directed.  IF Laceration:  - Continue use of thompson bottle and sitz bath as directed.  - Use Dermoplast and/or Tucks as directed.  IF  Incision:  - Splint incision when moving and turning.  - Medications as instructed.  SPECIAL INFORMATION:  - No sexual intercourse for 6 weeks.  After that, use a barrier contraception until your doctor tells you it is ok to use something different.     - Breastfeeding is not a method of birth control.  - You may have a period while breastfeeding.  Your first period may come 4 to 10 weeks after delivery, or later if you are breastfeeding.  - Avoid constipation.  Drink plenty of water, eat vegetables and fruits high in natural fiber, high grain breads and cereals.  You may use a stool softener as necessary.  COMPLICATIONS:  Call you doctor if any of the following occur:  - Continuing bright red vaginal bleeding or clots larger than a lemon.  - Pain or redness in the breasts.  - Fever over 100.4 when temperature is taken by mouth.  - Burning feeling with urination.  - Bad smelling vaginal drainage.  - Incision or episiotomy pulls apart, is red or has draiage.    Postop  Birth Instructions    Activity     Do not lift more than 10 pounds for 6 weeks after surgery.  Ask family and friends for help when you need it.  No driving until you have stopped taking your pain medications (usually two weeks after surgery).  No heavy exercise or activity for 6 weeks.  Don't do anything that will put a strain on your surgery site.  Don't strain when using the toilet.  Your care team may prescribe a stool softener if you have problems with your bowel movements.     To care for your incision:     Keep the incision clean and dry.  Do not soak your incision in water. No  swimming or hot tubs until it has fully healed. You may soak in the bathtub if the water level is below your incision.  Do not use peroxide, gel, cream, lotion, or ointment on your incision.  Adjust your clothes to avoid pressure on your surgery site (check the elastic in your underwear for example).     You may see a small amount of clear or pink drainage and this is normal.  Check with your health care provider:     If the drainage increases or has an odor.  If the incision reddens, you have swelling, or develop a rash.  If you have increased pain and the medicine we prescribed doesn't help.  If you have a fever above 100.4 F (38 C) with or without chills when placing thermometer under your tongue.   The area around your incision (surgery wound), will feel numb.  This is normal. The numbness should go away in less than a year.     Keep your hands clean:  Always wash your hands before touching your incision (surgery wound). This helps reduce your risk of infection. If your hands aren't dirty, you may use an alcohol hand-rub to clean your hands. Keep your nails clean and short.    Call your healthcare provider if you have any of these symptoms:     You soak a sanitary pad with blood within 1 hour, or you see blood clots larger than a golf ball.  Bleeding that lasts more than 6 weeks.  Vaginal discharge that smells bad.  Severe pain, cramping or tenderness in your lower belly area.  A need to urinate more frequently (use the toilet more often), more urgently (use the toilet very quickly), or it burns when you urinate.  Nausea and vomiting.  Redness, swelling or pain around a vein in your leg.  Problems breastfeeding or a red or painful area on your breast.  Chest pain and cough or are gasping for air.  Problems with coping with sadness, anxiety or depression. If you have concerns about hurting yourself or the baby, call your provider immediately.    You have questions or concerns after you return home.

## 2023-04-27 NOTE — PLAN OF CARE
Vital signs stable, BP slightly elevated. Postpartum checks within defined limits. Voiding spontaneously, passing flatus. Ambulating independently, denies dizziness. Reports pain is well controlled with ibuprofen and tylenol. Abdominal incision is clean, dry, approximated with steri strips. No signs of infection in surrounding tissue. Breastfeeding every 2-3 hours, latch observed with audible swallows. Attentive to  cues, bonding well.

## 2023-04-27 NOTE — PLAN OF CARE
Data: Vital signs within normal limits.Bps have been running between 140-150/80s- gave additional Procardia dose. Postpartum checks within normal limits - see flow record. Patient eating and drinking normally. Patient able to empty bladder independently and is up ambulating. No apparent signs of infection. Patient performing self cares, is able to care for infant and is breast and formula feeding every 2-3 hours.  Incision appears within normal. Is using Tylenol and Ibuprofen for mild discomfort.    Action: Patient medicated during the shift for cramping. See MAR. Patient education done, see flow record.  Response: Positive attachment behaviors observed with infant. Patient's spouse present this shift.   Plan: Continue current plan of care.  Anticipate discharge on 4/27/23.

## 2023-04-27 NOTE — PROGRESS NOTES
Patient Name:  Titus Arnold   MRN:  [id number]  Age:  27 year old    YOB: 1995      POSTPARTUM/POST-OPERATIVE PROGRESS NOTE  Pt is POD#3 s/p C/S.  She is doing well without complaints. Denies headaches, nausea or vomiting.   Pt is ambulating and tolerating a regular diet.  Jj is out. and she is voiding without complication.  Pain is well controlled with PO medication and lochia is within normal limits.  She is breastfeeding.  Baby is doing well. Denies stx's of pre-e. Has BP cuff at home.    Objective:    Temp:  [98.3  F (36.8  C)-98.6  F (37  C)] 98.3  F (36.8  C)  Pulse:  [74-85] 80  Resp:  [15-18] 16  BP: (137-155)/(78-90) 137/84  226 lbs 9.6 oz   Vitals:    04/27/23 0318 04/27/23 0612 04/27/23 0842 04/27/23 1018   BP: (!) 140/83 (!) 146/83 (!) 148/89 137/84   BP Location: Left arm Left arm Left arm Left arm   Patient Position: Sitting Semi-Cevallos's Sitting Sitting   Cuff Size: Adult Large Adult Large Adult Large Adult Large   Pulse: 74 76 80 80   Resp:   16    Temp:   98.3  F (36.8  C)    TempSrc:   Oral    SpO2:       Weight:       Height:               General Appearance:  NAD, A&O x 3, comfortable  Lungs:  unlabored  Abdomen:  soft, appropriately tender near incision; no rebound or guarding  Fundus:  firm, below the umbilicus  Incision:  clean, dry and intact  Lower extremities: 2+ edema, no cords; no hyperreflexia       Lab Review:    ABO/RH(D)   Date Value Ref Range Status   04/24/2023 O POS  Final     Hemoglobin   Date Value Ref Range Status   04/25/2023 8.1 (L) 11.7 - 15.7 g/dL Final   04/24/2023 10.1 (L) 11.7 - 15.7 g/dL Final   04/15/2022 9.7 (L) 11.7 - 15.7 g/dL Final   01/20/2021 9.2 (L) 11.7 - 15.7 g/dL Final   01/19/2021 9.5 (L) 11.7 - 15.7 g/dL Final   01/19/2021 9.4 (L) 11.7 - 15.7 g/dL Final     Hematocrit   Date Value Ref Range Status   04/24/2023 32.3 (L) 35.0 - 47.0 % Final   01/20/2021 29.0 (L) 35.0 - 47.0 % Final   01/19/2021 30.2 (L) 35.0 - 47.0 % Final   08/27/2015  27.8 (L) 35.0 - 47.0 % Final     Recent Labs   Lab 23  0453 23  1936 23  1536   GLC 91 79 69*           Assessment:  POD#3 s/p  for pre-e without severe features. Mild anemia.    Plan:  Pre-e without severe features.   --Increased Procardia today to 60mg in AM, will see if 30mg at night needed.  --watch Bps to make sure controlled    S/p csection  - Post-op: recovering well. Pain well controlled. Cont PO pain meds and regular diet. Encourage ambulation.  --lovenox for DVT prevention whie inpatient, given obesity    GDMA1  --post partum glucose ok    Anemia  --PO iron    Dispo  --anticipate DC POD#3 or #4

## 2023-04-27 NOTE — PROVIDER NOTIFICATION
04/27/23 1628   Provider Notification   Provider Name/Title Dr. Reynaga   Method of Notification Phone   Request Evaluate-Remote   Notification Reason Vital Signs Change     Blood pressures remain slightly elevated throughout afternoon. Per Dr. Reynaga, plan to add evening dose of Nifedipine 30 mg.

## 2023-04-28 PROCEDURE — 250N000011 HC RX IP 250 OP 636: Performed by: OBSTETRICS & GYNECOLOGY

## 2023-04-28 PROCEDURE — 250N000013 HC RX MED GY IP 250 OP 250 PS 637: Performed by: OBSTETRICS & GYNECOLOGY

## 2023-04-28 PROCEDURE — 120N000001 HC R&B MED SURG/OB

## 2023-04-28 RX ORDER — LABETALOL 200 MG/1
200 TABLET, FILM COATED ORAL EVERY 8 HOURS SCHEDULED
Status: DISCONTINUED | OUTPATIENT
Start: 2023-04-28 | End: 2023-04-29 | Stop reason: HOSPADM

## 2023-04-28 RX ADMIN — ACETAMINOPHEN 975 MG: 325 TABLET ORAL at 22:20

## 2023-04-28 RX ADMIN — IBUPROFEN 800 MG: 800 TABLET ORAL at 22:20

## 2023-04-28 RX ADMIN — Medication 1 TABLET: at 09:44

## 2023-04-28 RX ADMIN — IBUPROFEN 800 MG: 800 TABLET ORAL at 15:49

## 2023-04-28 RX ADMIN — IBUPROFEN 800 MG: 800 TABLET ORAL at 09:44

## 2023-04-28 RX ADMIN — SENNOSIDES AND DOCUSATE SODIUM 1 TABLET: 50; 8.6 TABLET ORAL at 20:55

## 2023-04-28 RX ADMIN — ENOXAPARIN SODIUM 40 MG: 40 INJECTION SUBCUTANEOUS at 07:01

## 2023-04-28 RX ADMIN — ACETAMINOPHEN 975 MG: 325 TABLET ORAL at 15:49

## 2023-04-28 RX ADMIN — LABETALOL HYDROCHLORIDE 200 MG: 200 TABLET, FILM COATED ORAL at 22:20

## 2023-04-28 RX ADMIN — NIFEDIPINE 30 MG: 30 TABLET, FILM COATED, EXTENDED RELEASE ORAL at 20:55

## 2023-04-28 RX ADMIN — NIFEDIPINE 60 MG: 30 TABLET, FILM COATED, EXTENDED RELEASE ORAL at 09:43

## 2023-04-28 RX ADMIN — LABETALOL HYDROCHLORIDE 200 MG: 200 TABLET, FILM COATED ORAL at 16:28

## 2023-04-28 RX ADMIN — IBUPROFEN 800 MG: 800 TABLET ORAL at 03:11

## 2023-04-28 RX ADMIN — ACETAMINOPHEN 975 MG: 325 TABLET ORAL at 03:11

## 2023-04-28 RX ADMIN — SENNOSIDES AND DOCUSATE SODIUM 1 TABLET: 50; 8.6 TABLET ORAL at 09:43

## 2023-04-28 RX ADMIN — ACETAMINOPHEN 975 MG: 325 TABLET ORAL at 09:43

## 2023-04-28 ASSESSMENT — ACTIVITIES OF DAILY LIVING (ADL)
ADLS_ACUITY_SCORE: 18

## 2023-04-28 NOTE — PLAN OF CARE
Data: BP mildly elevated, otherwise Vital signs within normal limits. Postpartum checks within normal limits - see flow record. Patient eating and drinking normally. Patient able to empty bladder independently and is up ambulating. Patient performing self cares, is able to care for infant and is breastfeeding every 2-3 hours. Incision foam pressure dressing applied, abd underneath has moist drainage of bright red blood to left side (had bump under skin this AM), to keep monitoring, if dripping down leg, page provider. Using ice for discomfort. keeping abd binder off at this time.   Action: Patient medicated with ibuprofen and tylenol during the shift for pain. See MAR. Adequate pain control noted by patient. Patient education done, see flow record.  Response: Positive attachment behaviors observed with infant. Patient's spouse present this shift.   Plan: Continue current plan of care.  Anticipate discharge on 4/29 - if stable.

## 2023-04-28 NOTE — PROGRESS NOTES
[facility] Obstetrics Post-Op / Progress Note    Interval History   Doing well.  Pain is well-controlled.  No fevers.  Good appetite.  Denies chest pain, shortness of breath, nausea or vomiting.  Ambulatory.  Breastfeeding well.    Called to evaluate incision this AM, due to copious bright red bleeding from left corner of incision. Upon arrival, bleeding had been stopped, but now a ping pong ball sized hematoma was noted underlying the incision. This was drained with manual pressure, through a pinprick opening about 2cm from the left angle. A pressure dressing was applied. No further bleeding noted.     Medications     dextrose 5% lactated ringers 125 mL/hr at 04/25/23 0503     - MEDICATION INSTRUCTIONS -       - MEDICATION INSTRUCTIONS -       oxytocin in 0.9% NaCl       oxytocin in 0.9% NaCl         acetaminophen  975 mg Oral Q6H     enoxaparin ANTICOAGULANT  40 mg Subcutaneous Q24H     ferrous fumarate 65 mg ([place]. FE)-Vitamin C 125 mg  1 tablet Oral Every Other Day     ibuprofen  800 mg Oral Q6H     iron sucrose  300 mg Intravenous Q72H     labetalol  200 mg Oral Q8H JAYME     NIFEdipine ER OSMOTIC  30 mg Oral Q24H     NIFEdipine ER OSMOTIC  60 mg Oral Daily     senna-docusate  1 tablet Oral BID    Or     senna-docusate  2 tablet Oral BID     sodium chloride (PF)  3 mL Intracatheter Q8H       Physical Exam   Temp: 98.2  F (36.8  C) Temp src: Oral BP: (!) 154/90 Pulse: 84   Resp: 18   O2 Device: None (Room air)    Vitals:    04/25/23 1954 04/26/23 2001 04/27/23 2244   Weight: 105.9 kg (233 lb 6.4 oz) 102.8 kg (226 lb 9.6 oz) 99.5 kg (219 lb 5 oz)     Vital Signs with Ranges  Temp:  [98.2  F (36.8  C)-99  F (37.2  C)] 98.2  F (36.8  C)  Pulse:  [84-90] 84  Resp:  [16-18] 18  BP: (129-154)/(80-90) 154/90  I/O last 3 completed shifts:  In: 2500 [P.O.:2500]  Out: 2110 [Urine:2110]    Uterine fundus is firm, non-tender and at the level of the umbilicus  Incision covered with pressure  dressing    Data   Recent Labs   Lab Test 23  1536 04/15/22  1150 21  1234   ABO  --   --  O   RH  --   --  Pos   AS Negative   < >  --     < > = values in this interval not displayed.     Recent Labs   Lab Test 23  0720 23  1536   HGB 8.1* 10.1*     Recent Labs   Lab Test 03/30/15  0000   RUQIGG Immune       Assessment & Plan   -4 Days Post-Op Procedure(s):   section    Pre-e without severe features.   --Increased Procardia to 60mg in AM, 30mg at night on .  --Added labetalol 200mg q8 hours , as BP labile, to 154/90  --watch Bps to make sure controlled, home once in range for 24h.     S/p csection  - Post-op: recovering well. Pain well controlled. Cont PO pain meds and regular diet. Encourage ambulation.  --lovenox for DVT prevention whie inpatient, given obesity     GDMA1  --post partum glucose ok     Anemia  --PO iron     Dispo  --anticipate DC when BP well controlled for 24h    Shital Gauthier MD

## 2023-04-28 NOTE — PROVIDER NOTIFICATION
"   04/28/23 0942   Provider Notification   Provider Name/Title Dr Gauthier     Patient \"gushing\" blood from small piece of L side of incision  "

## 2023-04-28 NOTE — PROVIDER NOTIFICATION
04/28/23 1641   Provider Notification   Provider Name/Title Dr Gauthier   Method of Notification Phone   Request Evaluate-Remote   Notification Reason Status Update     Incision dressing assessed. Under the foam, abd is saturated but no blood is draining down.    Order- call if bleeding starts running out of dressing.

## 2023-04-28 NOTE — PLAN OF CARE
Vitals stable this shift. Blood pressures every 4 hours. Incision site WNL steri strips in place. Weight taken. I/Os done. Breast feeding. Bonding well with baby.

## 2023-04-29 VITALS
OXYGEN SATURATION: 100 % | TEMPERATURE: 98.3 F | DIASTOLIC BLOOD PRESSURE: 74 MMHG | HEART RATE: 80 BPM | WEIGHT: 215.4 LBS | HEIGHT: 66 IN | BODY MASS INDEX: 34.62 KG/M2 | SYSTOLIC BLOOD PRESSURE: 130 MMHG | RESPIRATION RATE: 18 BRPM

## 2023-04-29 PROCEDURE — 250N000013 HC RX MED GY IP 250 OP 250 PS 637: Performed by: OBSTETRICS & GYNECOLOGY

## 2023-04-29 PROCEDURE — 250N000011 HC RX IP 250 OP 636: Performed by: OBSTETRICS & GYNECOLOGY

## 2023-04-29 RX ORDER — IBUPROFEN 800 MG/1
800 TABLET, FILM COATED ORAL EVERY 6 HOURS PRN
Qty: 60 TABLET | Refills: 1 | Status: SHIPPED | OUTPATIENT
Start: 2023-04-29

## 2023-04-29 RX ORDER — LABETALOL 200 MG/1
200 TABLET, FILM COATED ORAL EVERY 8 HOURS
Qty: 30 TABLET | Refills: 1 | Status: SHIPPED | OUTPATIENT
Start: 2023-04-29

## 2023-04-29 RX ORDER — NIFEDIPINE 30 MG
TABLET, EXTENDED RELEASE ORAL
Qty: 60 TABLET | Refills: 1 | Status: SHIPPED | OUTPATIENT
Start: 2023-04-29

## 2023-04-29 RX ADMIN — ACETAMINOPHEN 975 MG: 325 TABLET ORAL at 10:19

## 2023-04-29 RX ADMIN — SENNOSIDES AND DOCUSATE SODIUM 1 TABLET: 50; 8.6 TABLET ORAL at 08:32

## 2023-04-29 RX ADMIN — ENOXAPARIN SODIUM 40 MG: 40 INJECTION SUBCUTANEOUS at 06:25

## 2023-04-29 RX ADMIN — IBUPROFEN 800 MG: 800 TABLET ORAL at 10:19

## 2023-04-29 RX ADMIN — ACETAMINOPHEN 975 MG: 325 TABLET ORAL at 04:22

## 2023-04-29 RX ADMIN — IBUPROFEN 800 MG: 800 TABLET ORAL at 04:22

## 2023-04-29 RX ADMIN — NIFEDIPINE 60 MG: 30 TABLET, FILM COATED, EXTENDED RELEASE ORAL at 08:32

## 2023-04-29 RX ADMIN — LABETALOL HYDROCHLORIDE 200 MG: 200 TABLET, FILM COATED ORAL at 06:26

## 2023-04-29 ASSESSMENT — ACTIVITIES OF DAILY LIVING (ADL)
ADLS_ACUITY_SCORE: 18

## 2023-04-29 NOTE — PLAN OF CARE
Incision has no more drainage noted. BP stable. Dr pittman gave verbal order to place discharge order.     Discharge instructions completed.  Patient states she understands all discharge instructions and all her questions have been answered.  Verbalizes when she needs to return to clinic for follow up for herself (1 week and 6 weeks).  She is caring for herself independently.  Prescriptions reviewed and sent to pharmacy.  Postpartum depression symptoms reviewed and encouraged frequent review of depression scale.

## 2023-04-29 NOTE — PLAN OF CARE
Goal Outcome Evaluation:  Data: Vital signs within normal limits. BP still elevated but no severe and no symptoms. Postpartum checks within normal limits - see flow record. Patient eating and drinking normally. Patient able to empty bladder independently and is up ambulating. No apparent signs of infection. Incision appears within normal. Abd and foam tape still in place. No change in dressing drainage. Is using tylenol and ibuprofen for discomfort.  Rested in bed this shift.  Action: Patient medicated during the shift for pain. See MAR. Patient reassessed within 1 hour after each medication and pain was improved - patient stated she was comfortable. Patient education done, see flow record.  Response: Positive attachment behaviors observed with infant. Patient's spouse present this shift and attentive to patient's needs.   Plan: Continue current plan of care.  Anticipate discharge on 4/29.

## 2023-04-29 NOTE — PROGRESS NOTES
[facility] Obstetrics Post-Op / Progress Note    Interval History   Doing well.  Pain is well-controlled.  No fevers.  No history of wound drainage, warmth or significant erythema.  Good appetite.  Denies chest pain, shortness of breath, nausea or vomiting.  Ambulatory.  Breastfeeding well. No bleeding from the pressure bandage overnight.     Medications     dextrose 5% lactated ringers 125 mL/hr at 04/25/23 0503     - MEDICATION INSTRUCTIONS -       - MEDICATION INSTRUCTIONS -       oxytocin in 0.9% NaCl       oxytocin in 0.9% NaCl         acetaminophen  975 mg Oral Q6H     enoxaparin ANTICOAGULANT  40 mg Subcutaneous Q24H     ferrous fumarate 65 mg ([place]. FE)-Vitamin C 125 mg  1 tablet Oral Every Other Day     ibuprofen  800 mg Oral Q6H     iron sucrose  300 mg Intravenous Q72H     labetalol  200 mg Oral Q8H JAYME     NIFEdipine ER OSMOTIC  30 mg Oral Q24H     NIFEdipine ER OSMOTIC  60 mg Oral Daily     senna-docusate  1 tablet Oral BID    Or     senna-docusate  2 tablet Oral BID     sodium chloride (PF)  3 mL Intracatheter Q8H       Physical Exam   Temp: 97.9  F (36.6  C) Temp src: Oral BP: 125/73 Pulse: 78   Resp: 17   O2 Device: None (Room air)    Vitals:    04/25/23 1954 04/26/23 2001 04/27/23 2244   Weight: 105.9 kg (233 lb 6.4 oz) 102.8 kg (226 lb 9.6 oz) 99.5 kg (219 lb 5 oz)     Vital Signs with Ranges  Temp:  [97.9  F (36.6  C)-98.2  F (36.8  C)] 97.9  F (36.6  C)  Pulse:  [78-90] 78  Resp:  [17-18] 17  BP: (122-154)/(68-92) 125/73  I/O last 3 completed shifts:  In: 3200 [P.O.:3200]  Out: 650 [Urine:650]    Uterine fundus is firm, non-tender and at the level of the umbilicus  Incision C/D/I once pressure dressing removed and no active drainage. There is a 0.5cm skin separation at left end of incision     Data   Recent Labs   Lab Test 04/24/23  1536 04/15/22  1150 01/19/21  1234   ABO  --   --  O   RH  --   --  Pos   AS Negative   < >  --     < > = values in this interval not  displayed.     Recent Labs   Lab Test 23  0720 23  1536   HGB 8.1* 10.1*     Recent Labs   Lab Test 03/30/15  0000   RUQIGG Immune       Assessment & Plan   -5 Days Post-Op Procedure(s):   section     Pre-e without severe features.   --Increased Procardia to 60mg in AM, 30mg at night on .  --Added labetalol 200mg q8 hours , as BP labile, to 154/90 (last elevated on 1406 on )  --watch Bps to make sure controlled, home once in range for 24h.  Will wait for noon BP check before possible discharge today since BP >150 systolic at 2pm yesterday      S/p csection  - Post-op: recovering well. Pain well controlled. Cont PO pain meds and regular diet. Encourage ambulation.  --lovenox for DVT prevention whie inpatient, given obesity  -- left wound hematoma that is stable but needs wound check in clinic next week. Will make sure bleeding does not reoccur before discharge.      GDMA1  --post partum glucose ok     Anemia  --PO iron     Dispo  --anticipate DC later today if BP at noon is ok    Devorah Concepcion, DO, DO

## 2023-06-03 ENCOUNTER — HEALTH MAINTENANCE LETTER (OUTPATIENT)
Age: 28
End: 2023-06-03

## 2025-08-29 ENCOUNTER — APPOINTMENT (OUTPATIENT)
Dept: ULTRASOUND IMAGING | Facility: CLINIC | Age: 30
End: 2025-08-29
Attending: STUDENT IN AN ORGANIZED HEALTH CARE EDUCATION/TRAINING PROGRAM
Payer: COMMERCIAL

## 2025-08-29 PROCEDURE — 76817 TRANSVAGINAL US OBSTETRIC: CPT

## (undated) DEVICE — TUBING SUCTION VACUUM COLLECTION 6FT 610

## (undated) DEVICE — PACK C-SECTION LF PL15OTA83B

## (undated) DEVICE — BLADE CLIPPER SGL USE 9680

## (undated) DEVICE — GLOVE BIOGEL PI MICRO INDICATOR UNDERGLOVE SZ 6.0 48960

## (undated) DEVICE — SUCTION CANNULA UTERINE 12MM CVD  21555

## (undated) DEVICE — GLOVE BIOGEL PI ULTRATOUCH SZ 6.0 41160

## (undated) DEVICE — LINEN HALF SHEET 5512

## (undated) DEVICE — ESU GROUND PAD ADULT W/CORD E7507

## (undated) DEVICE — PACK MINOR LITHOTOMY RIDGES

## (undated) DEVICE — SOL WATER IRRIG 1000ML BOTTLE 2F7114

## (undated) DEVICE — BAG CLEAR TRASH 1.3M 39X33" P4040C

## (undated) DEVICE — SOL NACL 0.9% IRRIG 1000ML BOTTLE 2F7124

## (undated) DEVICE — DRSG STERI STRIP 1/2X4" R1547

## (undated) DEVICE — DRSG ABDOMINAL 07 1/2X8" 7197D

## (undated) DEVICE — SUCTION CANNULA UTERINE 14MM CVD 21593A

## (undated) DEVICE — CAP BABY PINK/BLUE IC-2

## (undated) DEVICE — CATH TRAY URETHRAL 14FR LF 772417

## (undated) DEVICE — SPECIMEN TRAP VACUUM SUCTION 003984-901

## (undated) DEVICE — LINEN TOWEL PACK X10 5473

## (undated) DEVICE — PREP CHLORAPREP 26ML TINTED HI-LITE ORANGE 930815

## (undated) DEVICE — LINEN TOWEL PACK X5 5464

## (undated) DEVICE — FILTER BERKLEY SAFETOUCH

## (undated) DEVICE — PAD CHUX UNDERPAD 30X36" P3036C

## (undated) DEVICE — TRANSFER DEVICE BLOOD NDL HOLDER 364880

## (undated) DEVICE — SU MONOCRYL 0 CT 36" Y358H

## (undated) DEVICE — LINEN DRAPE 54X72" 5467

## (undated) DEVICE — LINEN FULL SHEET 5511

## (undated) DEVICE — DRSG TELFA ISLAND 4X10"

## (undated) DEVICE — SU VICRYL 3-0 KS 27" UND J663H

## (undated) DEVICE — SU VICRYL 0 CT 36" J358H

## (undated) DEVICE — SUCTION MITY VAC MUSHROOM CUP 10007LP

## (undated) DEVICE — STOCKING SLEEVE VASOPRESS COMPRESSION CALF MED VP501M

## (undated) DEVICE — GLOVE ESTEEM POWDER FREE SMT 6.0  2D72PT60

## (undated) DEVICE — CATH TRAY FOLEY 16FR SILICONE 907416

## (undated) DEVICE — Device

## (undated) RX ORDER — KETOROLAC TROMETHAMINE 30 MG/ML
INJECTION, SOLUTION INTRAMUSCULAR; INTRAVENOUS
Status: DISPENSED
Start: 2023-04-24

## (undated) RX ORDER — LIDOCAINE HYDROCHLORIDE 10 MG/ML
INJECTION, SOLUTION EPIDURAL; INFILTRATION; INTRACAUDAL; PERINEURAL
Status: DISPENSED
Start: 2022-04-15

## (undated) RX ORDER — MORPHINE SULFATE 1 MG/ML
INJECTION, SOLUTION EPIDURAL; INTRATHECAL; INTRAVENOUS
Status: DISPENSED
Start: 2023-04-24

## (undated) RX ORDER — PROPOFOL 10 MG/ML
INJECTION, EMULSION INTRAVENOUS
Status: DISPENSED
Start: 2022-04-15

## (undated) RX ORDER — METHYLERGONOVINE MALEATE 0.2 MG/ML
INJECTION INTRAVENOUS
Status: DISPENSED
Start: 2022-04-15

## (undated) RX ORDER — ONDANSETRON 2 MG/ML
INJECTION INTRAMUSCULAR; INTRAVENOUS
Status: DISPENSED
Start: 2022-04-15

## (undated) RX ORDER — FENTANYL CITRATE 50 UG/ML
INJECTION, SOLUTION INTRAMUSCULAR; INTRAVENOUS
Status: DISPENSED
Start: 2022-04-15

## (undated) RX ORDER — OXYTOCIN/0.9 % SODIUM CHLORIDE 30/500 ML
PLASTIC BAG, INJECTION (ML) INTRAVENOUS
Status: DISPENSED
Start: 2023-04-24

## (undated) RX ORDER — ACETAMINOPHEN 325 MG/1
TABLET ORAL
Status: DISPENSED
Start: 2022-04-15

## (undated) RX ORDER — FENTANYL CITRATE-0.9 % NACL/PF 10 MCG/ML
PLASTIC BAG, INJECTION (ML) INTRAVENOUS
Status: DISPENSED
Start: 2023-04-24

## (undated) RX ORDER — DOXYCYCLINE 100 MG/1
CAPSULE ORAL
Status: DISPENSED
Start: 2022-04-15

## (undated) RX ORDER — TRANEXAMIC ACID 10 MG/ML
INJECTION, SOLUTION INTRAVENOUS
Status: DISPENSED
Start: 2022-04-15

## (undated) RX ORDER — DEXAMETHASONE SODIUM PHOSPHATE 4 MG/ML
INJECTION, SOLUTION INTRA-ARTICULAR; INTRALESIONAL; INTRAMUSCULAR; INTRAVENOUS; SOFT TISSUE
Status: DISPENSED
Start: 2022-04-15

## (undated) RX ORDER — KETOROLAC TROMETHAMINE 30 MG/ML
INJECTION, SOLUTION INTRAMUSCULAR; INTRAVENOUS
Status: DISPENSED
Start: 2022-04-15